# Patient Record
Sex: MALE | Race: WHITE | HISPANIC OR LATINO | Employment: FULL TIME | URBAN - METROPOLITAN AREA
[De-identification: names, ages, dates, MRNs, and addresses within clinical notes are randomized per-mention and may not be internally consistent; named-entity substitution may affect disease eponyms.]

---

## 2023-03-15 DIAGNOSIS — I10 HYPERTENSION, UNSPECIFIED TYPE: Primary | ICD-10-CM

## 2023-03-16 RX ORDER — LOSARTAN POTASSIUM AND HYDROCHLOROTHIAZIDE 25; 100 MG/1; MG/1
TABLET ORAL
Qty: 90 TABLET | Refills: 0 | Status: SHIPPED | OUTPATIENT
Start: 2023-03-16 | End: 2023-08-08

## 2023-03-16 RX ORDER — NEBIVOLOL 10 MG/1
10 TABLET ORAL DAILY
Qty: 90 TABLET | Refills: 0 | Status: SHIPPED | OUTPATIENT
Start: 2023-03-16 | End: 2023-05-30

## 2023-04-05 RX ORDER — VENLAFAXINE HYDROCHLORIDE 150 MG/1
150 CAPSULE, EXTENDED RELEASE ORAL DAILY
Qty: 30 CAPSULE | Refills: 1 | Status: SHIPPED | OUTPATIENT
Start: 2023-04-05 | End: 2023-05-31 | Stop reason: SDUPTHER

## 2023-05-05 ENCOUNTER — OFFICE VISIT (OUTPATIENT)
Dept: PRIMARY CARE | Facility: CLINIC | Age: 48
End: 2023-05-05
Payer: COMMERCIAL

## 2023-05-05 VITALS
TEMPERATURE: 97.1 F | HEIGHT: 68 IN | WEIGHT: 208 LBS | DIASTOLIC BLOOD PRESSURE: 82 MMHG | HEART RATE: 82 BPM | BODY MASS INDEX: 31.52 KG/M2 | SYSTOLIC BLOOD PRESSURE: 122 MMHG | OXYGEN SATURATION: 98 %

## 2023-05-05 DIAGNOSIS — Z00.00 ANNUAL PHYSICAL EXAM: Primary | ICD-10-CM

## 2023-05-05 DIAGNOSIS — F41.1 GENERALIZED ANXIETY DISORDER: ICD-10-CM

## 2023-05-05 DIAGNOSIS — Z13.1 SCREENING FOR DIABETES MELLITUS: ICD-10-CM

## 2023-05-05 DIAGNOSIS — E78.2 MIXED HYPERLIPIDEMIA: ICD-10-CM

## 2023-05-05 DIAGNOSIS — I10 ESSENTIAL HYPERTENSION, BENIGN: ICD-10-CM

## 2023-05-05 PROBLEM — G89.4 CHRONIC PAIN SYNDROME: Status: ACTIVE | Noted: 2022-08-12

## 2023-05-05 PROBLEM — G47.33 OBSTRUCTIVE SLEEP APNEA SYNDROME: Status: ACTIVE | Noted: 2022-08-12

## 2023-05-05 PROBLEM — K21.9 GERD (GASTROESOPHAGEAL REFLUX DISEASE): Status: ACTIVE | Noted: 2023-05-05

## 2023-05-05 PROBLEM — E55.9 VITAMIN D DEFICIENCY: Status: ACTIVE | Noted: 2022-08-12

## 2023-05-05 PROBLEM — F41.9 ANXIETY: Status: ACTIVE | Noted: 2023-05-05

## 2023-05-05 PROCEDURE — 3074F SYST BP LT 130 MM HG: CPT | Performed by: FAMILY MEDICINE

## 2023-05-05 PROCEDURE — 3079F DIAST BP 80-89 MM HG: CPT | Performed by: FAMILY MEDICINE

## 2023-05-05 PROCEDURE — 99214 OFFICE O/P EST MOD 30 MIN: CPT | Performed by: FAMILY MEDICINE

## 2023-05-05 PROCEDURE — 3008F BODY MASS INDEX DOCD: CPT | Performed by: FAMILY MEDICINE

## 2023-05-05 RX ORDER — FENOFIBRATE 145 MG/1
TABLET, FILM COATED ORAL
COMMUNITY
Start: 2021-07-23 | End: 2023-08-29 | Stop reason: SDUPTHER

## 2023-05-05 RX ORDER — AMITRIPTYLINE HYDROCHLORIDE 10 MG/1
TABLET, FILM COATED ORAL
COMMUNITY
End: 2023-05-05 | Stop reason: WASHOUT

## 2023-05-05 RX ORDER — ATORVASTATIN CALCIUM 80 MG/1
TABLET, FILM COATED ORAL
COMMUNITY
Start: 2021-07-26 | End: 2023-08-08

## 2023-05-05 RX ORDER — BUPROPION HYDROCHLORIDE 75 MG/1
75 TABLET ORAL 2 TIMES DAILY
Qty: 60 TABLET | Refills: 1 | Status: SHIPPED | OUTPATIENT
Start: 2023-05-05 | End: 2023-07-19

## 2023-05-05 NOTE — PROGRESS NOTES
Assessment     ASSESSMENT/PLAN:      Problem List Items Addressed This Visit          Circulatory    Essential hypertension, benign    Relevant Orders    Basic metabolic panel       Other    Generalized anxiety disorder    Relevant Medications    buPROPion (Wellbutrin) 75 mg tablet    Mixed hyperlipidemia    Relevant Orders    Lipid panel     Other Visit Diagnoses       Annual physical exam    -  Primary    Screening-pulmonary TB        Screening for diabetes mellitus        Relevant Orders    Hemoglobin A1c            Patient Instructions:  Patient Instructions   TB screening: Unable to perform due to computer technicalities, did offer TB spot however patient declined and would rather get TB skin test at health center  Anxiety: Discussed how changing Effexor may cause issues with anxiety control, opt to add low-dose Wellbutrin to augment depression symptoms      Signed by: Romeo Francois,        FUTURE DIRECTION:   If no improvement in symptoms can begin weaning venlafaxine starting at 75 mg    Subjective   SUBJECTIVE:     HPI : Patient is a 47 y.o. male who presents today for the following:     CPE:  -States that he needs TB skin test for his job    Anxiety:    -improved with Effexor 150 mg however concerned that he has issues with ductile dysfunction, would like to switch to Wellbutrin.  States that he felt better when he was using Wellbutrin alone    Not Addressed  HLD/hyper triglycerides:   -Continue atorvastatin, fenofibrate, incosepanet    TRISTON:   -Uses CPAP    GERD:   -Continue omeprazole, uses Zegerid      HTN:   -On losartan-HCTZ and Bystolic    Fatigue:   -Not taking amitriptyline 10 mg    Chronic pain:   -Patient does mention experiencing chronic low back pain, there was exacerbation over the past week however symptoms has improved    Left upper extremity neuropathy:   -Was seen in the emergency room 1 week ago due to paresthesias into face and neck, CT spine confirming arthritis and cervical  region, patient wants to try physical therapy, exercise, OMT, not interested in gabapentin or duloxetine or Lyrica    Review of Systems   All other systems reviewed and are negative.      Past Medical History:   Diagnosis Date    Anxiety     Hypertension     Sleep apnea         Past Surgical History:   Procedure Laterality Date    OTHER SURGICAL HISTORY  07/23/2021    Vasectomy    OTHER SURGICAL HISTORY  07/23/2021    Tooth extraction        Current Outpatient Medications   Medication Instructions    atorvastatin (Lipitor) 80 mg tablet oral    buPROPion (WELLBUTRIN) 75 mg, oral, 2 times daily    fenofibrate (Tricor) 145 mg tablet fenofibrate nanocrystallized 145 mg tablet    fish oil concentrate (Omega-3) 120-180 mg capsule     losartan-hydrochlorothiazide (Hyzaar) 100-25 mg tablet TAKE 1 TABLET BY MOUTH EVERY DAY    nebivolol (BYSTOLIC) 10 mg, oral, Daily    venlafaxine XR (EFFEXOR-XR) 150 mg, oral, Daily        No Known Allergies     Social History     Socioeconomic History    Marital status: Legally      Spouse name: Not on file    Number of children: Not on file    Years of education: Not on file    Highest education level: Not on file   Occupational History    Not on file   Tobacco Use    Smoking status: Former     Packs/day: 0.50     Types: Cigarettes    Smokeless tobacco: Not on file   Vaping Use    Vaping status: Not on file   Substance and Sexual Activity    Alcohol use: Yes     Alcohol/week: 2.0 - 3.0 standard drinks of alcohol     Types: 2 - 3 Glasses of wine per week    Drug use: Not on file    Sexual activity: Not on file   Other Topics Concern    Not on file   Social History Narrative    Not on file     Social Determinants of Health     Financial Resource Strain: Not on file   Food Insecurity: Not on file   Transportation Needs: Not on file   Physical Activity: Not on file   Stress: Not on file   Social Connections: Not on file   Intimate Partner Violence: Not on file   Housing Stability: Not  "on file        No family history on file.     Objective     OBJECTIVE:     Vitals:    05/05/23 1408   BP: 122/82   Pulse: 82   Temp: 36.2 °C (97.1 °F)   SpO2: 98%   Weight: 94.3 kg (208 lb)   Height: 1.727 m (5' 8\")        Physical Exam  Constitutional:       Appearance: Normal appearance.   HENT:      Head: Normocephalic.   Pulmonary:      Effort: Pulmonary effort is normal.   Musculoskeletal:      Cervical back: Normal range of motion.   Neurological:      Mental Status: He is alert.   Psychiatric:         Mood and Affect: Mood normal.       "

## 2023-05-16 ENCOUNTER — TELEPHONE (OUTPATIENT)
Dept: PRIMARY CARE | Facility: CLINIC | Age: 48
End: 2023-05-16

## 2023-05-16 NOTE — TELEPHONE ENCOUNTER
Pt called rx line on 5/15 at 423p [facility] a [facility], pt states only has 4 left.    [facility]  Next ov 5/23  Pt should have enough till OV

## 2023-05-18 RX ORDER — VENLAFAXINE HYDROCHLORIDE 150 MG/1
CAPSULE, EXTENDED RELEASE ORAL
Qty: 30 CAPSULE | Refills: 1 | OUTPATIENT
Start: 2023-05-18

## 2023-05-29 DIAGNOSIS — F41.1 GENERALIZED ANXIETY DISORDER: ICD-10-CM

## 2023-05-29 DIAGNOSIS — I10 HYPERTENSION, UNSPECIFIED TYPE: ICD-10-CM

## 2023-05-30 RX ORDER — NEBIVOLOL 10 MG/1
10 TABLET ORAL DAILY
Qty: 90 TABLET | Refills: 0 | Status: SHIPPED | OUTPATIENT
Start: 2023-05-30 | End: 2023-08-08

## 2023-05-30 RX ORDER — BUPROPION HYDROCHLORIDE 75 MG/1
TABLET ORAL
Qty: 60 TABLET | Refills: 1 | OUTPATIENT
Start: 2023-05-30

## 2023-05-31 RX ORDER — VENLAFAXINE HYDROCHLORIDE 150 MG/1
150 CAPSULE, EXTENDED RELEASE ORAL DAILY
Qty: 90 CAPSULE | Refills: 1 | Status: SHIPPED | OUTPATIENT
Start: 2023-05-31 | End: 2023-11-10 | Stop reason: SDUPTHER

## 2023-07-19 DIAGNOSIS — F41.1 GENERALIZED ANXIETY DISORDER: ICD-10-CM

## 2023-07-19 RX ORDER — BUPROPION HYDROCHLORIDE 75 MG/1
75 TABLET ORAL 2 TIMES DAILY
Qty: 60 TABLET | Refills: 3 | Status: SHIPPED | OUTPATIENT
Start: 2023-07-19 | End: 2023-11-10 | Stop reason: SDUPTHER

## 2023-08-07 DIAGNOSIS — I10 HYPERTENSION, UNSPECIFIED TYPE: ICD-10-CM

## 2023-08-07 DIAGNOSIS — E78.2 MIXED HYPERLIPIDEMIA: Primary | ICD-10-CM

## 2023-08-08 RX ORDER — ATORVASTATIN CALCIUM 80 MG/1
80 TABLET, FILM COATED ORAL NIGHTLY
Qty: 30 TABLET | Refills: 2 | Status: SHIPPED | OUTPATIENT
Start: 2023-08-08 | End: 2023-10-10

## 2023-08-08 RX ORDER — NEBIVOLOL 10 MG/1
10 TABLET ORAL DAILY
Qty: 30 TABLET | Refills: 2 | Status: SHIPPED | OUTPATIENT
Start: 2023-08-08 | End: 2023-11-10 | Stop reason: SDUPTHER

## 2023-08-08 RX ORDER — LOSARTAN POTASSIUM AND HYDROCHLOROTHIAZIDE 25; 100 MG/1; MG/1
TABLET ORAL
Qty: 30 TABLET | Refills: 2 | Status: SHIPPED | OUTPATIENT
Start: 2023-08-08 | End: 2023-11-10 | Stop reason: SDUPTHER

## 2023-08-29 DIAGNOSIS — E78.2 MIXED HYPERLIPIDEMIA: Primary | ICD-10-CM

## 2023-08-29 NOTE — TELEPHONE ENCOUNTER
Pt called requesting a refill on pended med    Last rx sent via [employer] 8/22 - 6 mo    Next OV 9/6  Pt OUT  [facility]

## 2023-08-31 RX ORDER — FENOFIBRATE 145 MG/1
145 TABLET, FILM COATED ORAL DAILY
Qty: 7 TABLET | Refills: 0 | Status: SHIPPED | OUTPATIENT
Start: 2023-08-31 | End: 2023-09-06 | Stop reason: SDUPTHER

## 2023-09-06 ENCOUNTER — TELEPHONE (OUTPATIENT)
Dept: PRIMARY CARE | Facility: CLINIC | Age: 48
End: 2023-09-06

## 2023-09-06 ENCOUNTER — TELEMEDICINE (OUTPATIENT)
Dept: PRIMARY CARE | Facility: CLINIC | Age: 48
End: 2023-09-06
Payer: COMMERCIAL

## 2023-09-06 ENCOUNTER — APPOINTMENT (OUTPATIENT)
Dept: PRIMARY CARE | Facility: CLINIC | Age: 48
End: 2023-09-06
Payer: COMMERCIAL

## 2023-09-06 DIAGNOSIS — E78.2 MIXED HYPERLIPIDEMIA: ICD-10-CM

## 2023-09-06 DIAGNOSIS — R73.01 IFG (IMPAIRED FASTING GLUCOSE): ICD-10-CM

## 2023-09-06 DIAGNOSIS — K85.80 OTHER ACUTE PANCREATITIS, UNSPECIFIED COMPLICATION STATUS (HHS-HCC): ICD-10-CM

## 2023-09-06 DIAGNOSIS — E78.1 HYPERTRIGLYCERIDEMIA: Primary | ICD-10-CM

## 2023-09-06 PROCEDURE — 99214 OFFICE O/P EST MOD 30 MIN: CPT | Performed by: FAMILY MEDICINE

## 2023-09-06 RX ORDER — FENOFIBRATE 145 MG/1
145 TABLET, FILM COATED ORAL DAILY
Qty: 90 TABLET | Refills: 0 | Status: SHIPPED | OUTPATIENT
Start: 2023-09-06 | End: 2023-11-10 | Stop reason: SDUPTHER

## 2023-09-06 RX ORDER — OMEGA-3-ACID ETHYL ESTERS 1 G/1
2 CAPSULE, LIQUID FILLED ORAL 2 TIMES DAILY
Qty: 360 CAPSULE | Refills: 0 | Status: SHIPPED | OUTPATIENT
Start: 2023-09-06 | End: 2023-11-10 | Stop reason: SDUPTHER

## 2023-09-06 ASSESSMENT — ENCOUNTER SYMPTOMS
ABDOMINAL PAIN: 0
SHORTNESS OF BREATH: 0

## 2023-09-06 NOTE — PROGRESS NOTES
Assessment     ASSESSMENT/PLAN:      Problem List Items Addressed This Visit       Mixed hyperlipidemia    Relevant Medications    fenofibrate (Tricor) 145 mg tablet    Other Relevant Orders    Referral to Cardiology    Lipid panel     Other Visit Diagnoses       Hypertriglyceridemia    -  Primary    Relevant Orders    Referral to Cardiology    Lipid panel    Other acute pancreatitis, unspecified complication status        Relevant Medications    omega-3 acid ethyl esters (Lovaza) 1 gram capsule    Other Relevant Orders    Hemoglobin A1c    IFG (impaired fasting glucose)        Relevant Orders    Referral to Endocrinology          Patient consented to virtual encounter. If patient was felt to need in-person evaluation they were directed to the office or ED as appropriate. Total time spent interacting with patient was 12: 55 minutes.     Patient presents today via video telemedicine encounter. This encounter is not taking place in person due to COVID.    Patient Instructions:  Patient Instructions   Acute Pancreatitis: pain has improved   Hypertriglyceridemia: refer to cardiology, fenofibrate and lovaza ordered, recheck triglycerides   IFG: recheck A1c, concerned about insulin resistance so pt was referred to endo   Work note provided       Signed by: Romeo Francois DO       FUTURE DIRECTION:   none    Subjective   SUBJECTIVE:     HPI : Patient is a 48 y.o. male who presents today for the followin/4 had covid, two weeks later had progressively back pain that migrated to abd. Pain worsened and EMS was called and went to [facility] on 2023 and transferred to ICU at [facility] because of triglycerifes was >[id number],   Symptoms improved and pt was discharged on 2023. Was started on lovaza and fenofibrate was represcribed.   Patient present with his wife and shares concern about insulin resistance. Although patient has significanlty changed diet by reducing red meats and carbs. Pt does note family  history of high triglycerides.       Review of Systems   Respiratory:  Negative for shortness of breath.    Cardiovascular:  Negative for chest pain.   Gastrointestinal:  Negative for abdominal pain.       Past Medical History:   Diagnosis Date    Anxiety     Hypertension     Sleep apnea         Past Surgical History:   Procedure Laterality Date    OTHER SURGICAL HISTORY  07/23/2021    Vasectomy    OTHER SURGICAL HISTORY  07/23/2021    Tooth extraction        Current Outpatient Medications   Medication Instructions    atorvastatin (LIPITOR) 80 mg, oral, Nightly    buPROPion (WELLBUTRIN) 75 mg, oral, 2 times daily    fenofibrate (TRICOR) 145 mg, oral, Daily    fish oil concentrate (Omega-3) 120-180 mg capsule     losartan-hydrochlorothiazide (Hyzaar) 100-25 mg tablet TAKE 1 TABLET BY MOUTH EVERY DAY    nebivolol (BYSTOLIC) 10 mg, oral, Daily    omega-3 acid ethyl esters (LOVAZA) 2 g, oral, 2 times daily    venlafaxine XR (EFFEXOR-XR) 150 mg, oral, Daily        No Known Allergies     Social History     Socioeconomic History    Marital status:      Spouse name: Not on file    Number of children: Not on file    Years of education: Not on file    Highest education level: Not on file   Occupational History    Not on file   Tobacco Use    Smoking status: Former     Packs/day: .5     Types: Cigarettes    Smokeless tobacco: Not on file   Substance and Sexual Activity    Alcohol use: Yes     Alcohol/week: 2.0 - 3.0 standard drinks of alcohol     Types: 2 - 3 Glasses of wine per week    Drug use: Not on file    Sexual activity: Not on file   Other Topics Concern    Not on file   Social History Narrative    Not on file     Social Determinants of Health     Financial Resource Strain: Not on file   Food Insecurity: Not on file   Transportation Needs: Not on file   Physical Activity: Not on file   Stress: Not on file   Social Connections: Not on file   Intimate Partner Violence: Not on file   Housing Stability: Not on file         No family history on file.     Objective     OBJECTIVE:     There were no vitals filed for this visit.     Physical Exam  Constitutional:       Appearance: Normal appearance.   HENT:      Head: Normocephalic.   Pulmonary:      Effort: Pulmonary effort is normal.   Musculoskeletal:      Cervical back: Normal range of motion.   Neurological:      Mental Status: He is alert.   Psychiatric:         Mood and Affect: Mood normal.

## 2023-09-06 NOTE — LETTER
Date: 2023  RE:  Gucci Winters  :  1975      To Whom It May Concern:    Our patient, Gucci, was hospitalized from 2023-2023.  Symptoms has improved and he is back to baseline. He may return back to work without restrictions.    Their return to work date is:  2023      Sincerely,      Romeo Francois DO

## 2023-09-06 NOTE — Clinical Note
Attempted to call pt but no response. Please let him know that I sent rx to [facility]. Please let me know if there is a different pharmacy he wanted. Not sure if he mentioned mail order.

## 2023-09-06 NOTE — PATIENT INSTRUCTIONS
Acute Pancreatitis: pain has improved   Hypertriglyceridemia: refer to cardiology, fenofibrate and lovaza ordered, recheck triglycerides   IFG: recheck A1c, concerned about insulin resistance so pt was referred to endo   Work note provided

## 2023-09-08 ENCOUNTER — TELEPHONE (OUTPATIENT)
Dept: PRIMARY CARE | Facility: CLINIC | Age: 48
End: 2023-09-08

## 2023-09-19 ENCOUNTER — LAB (OUTPATIENT)
Dept: LAB | Facility: LAB | Age: 48
End: 2023-09-19
Payer: COMMERCIAL

## 2023-09-19 DIAGNOSIS — E78.1 HYPERTRIGLYCERIDEMIA: ICD-10-CM

## 2023-09-19 DIAGNOSIS — K85.80 OTHER ACUTE PANCREATITIS, UNSPECIFIED COMPLICATION STATUS (HHS-HCC): ICD-10-CM

## 2023-09-19 DIAGNOSIS — E78.2 MIXED HYPERLIPIDEMIA: ICD-10-CM

## 2023-09-19 LAB
CHOLESTEROL (MG/DL) IN SER/PLAS: 167 MG/DL (ref 0–199)
CHOLESTEROL IN HDL (MG/DL) IN SER/PLAS: 45.5 MG/DL
CHOLESTEROL/HDL RATIO: 3.7
ESTIMATED AVERAGE GLUCOSE FOR HBA1C: 131 MG/DL
HEMOGLOBIN A1C/HEMOGLOBIN TOTAL IN BLOOD: 6.2 %
LDL: 72 MG/DL (ref 0–99)
NON HDL CHOLESTEROL: 122 MG/DL
TRIGLYCERIDE (MG/DL) IN SER/PLAS: 247 MG/DL (ref 0–149)
VLDL: 49 MG/DL (ref 0–40)

## 2023-09-19 PROCEDURE — 83036 HEMOGLOBIN GLYCOSYLATED A1C: CPT

## 2023-09-19 PROCEDURE — 36415 COLL VENOUS BLD VENIPUNCTURE: CPT

## 2023-09-19 PROCEDURE — 80061 LIPID PANEL: CPT

## 2023-09-20 ENCOUNTER — TELEPHONE (OUTPATIENT)
Dept: PRIMARY CARE | Facility: CLINIC | Age: 48
End: 2023-09-20

## 2023-09-25 NOTE — PROGRESS NOTES
[facility] V[facility] Card[facility]    Patient Name: Gucci Winters  Patient : 1975      Problem List Items Addressed This Visit       Essential hypertension, benign - Primary    Mixed hyperlipidemia    Obesity       Scribe Attestation  By signing my name below, IZahraa ErnaRandy Del Cid   attest that this documentation has been prepared under the direction and in the presence of Reed Ware DO.     Assessment/Plan:   Hypertension  The patient has a history of hypertension which appears controlled on exam today.  ECG done today showed sinus rhythm with a heart rate of 74 bpm.    He denies chest pain, headache or lightheadedness.  He reports that his usual blood pressure at home is in the 120s/80s range.   He should continue his current antihypertensive medications.    Recent lab works as noted in the HPI.   He should continue to monitor his blood pressure at home.  Continue risk factor modification.  Follow up in 1 year and sooner if necessary.     2. Dyslipidemia  Lipid panel done 2023 showed an LDL cholesterol of 72 and triglycerides of 247 while on atorvastatin 80 mg daily, fenofibrate 145 mg daily and Lovaza 1 g daily.  Coronary artery calcium scoring done in 2021 showed a total score of 0 placing the patient in a low risk group.  Please see lifestyle recommendations below.     3. Obesity  Please see lifestyle recommendations below.     Orders:   Follow-up in 1 year.    Lifestyle Recommendations     I recommend a whole-food plant-based diet, an eating pattern that encourages the consumption of unrefined plant foods (such as fruits, vegetables, tubers, whole grains, legumes, nuts and seeds) and discourages meats, dairy products, eggs and processed foods.           The AHA/ACC recommends that the patient consume a dietary pattern that emphasizes intake of vegetables, fruits, and whole grains; includes low-fat dairy products, poultry, fish, legumes, non-tropical vegetable  oils, and nuts; and limits intake of sodium, sweets, sugar-sweetened beverages, and red meats.     Adapt this dietary pattern to appropriate calorie requirements (a 500-750 kcal/day deficit to lose weight), personal and cultural food preferences, and nutrition therapy for other medical conditions (including diabetes).     Achieve this pattern by following plans such as the [place] Mediterranean, DASH dietary pattern, or AHA diet.           Engage in 2 hours and 30 minutes per week of moderate-intensity physical activity, or 1 hour and 15 minutes (75 minutes) per week of vigorous-intensity aerobic physical activity, or an equivalent combination of moderate and vigorous-intensity aerobic physical activity. Aerobic activity should be performed in episodes of at least 10 minutes preferably spread throughout the week.           Adhering to a heart healthy diet, regular exercise habits, avoidance of tobacco products, and maintenance of a healthy weight are crucial components of their heart disease risk reduction.           Any positive review of systems not specifically addressed in the office visit today should be evaluated and treated by the patients primary care physician or in an emergency department if necessary           Patient was notified that results from ordered tests will be called to the patient if it changes current management; it will otherwise be discussed at a future appointment.           Thank you for allowing me to participate in the care of this patient.                 This document was generated using the assistance of voice recognition software. If there are any errors of spelling, grammar, syntax, or meaning; please feel free to contact me directly for clarification.       Cardiac Testing  Echocardiogram:    Stress Testing:    Cardiac Cath:    Other Cardiac Testing:  Coronary artery calcium scoring done in November 2021 showed a total score of 0 placing the patient in a low risk group.    Reason for  visit:  This is a 48-year-old male here for evaluation regarding hypertension and dyslipidemia.      HPI:  This is a 48-year-old male here for evaluation regarding hypertension and dyslipidemia.  Patient was previously seen by cardiology in November 2021.  CBC done in February 2023 showed a hemoglobin of 15.3, serum magnesium was 1.52, BMP showed a serum sodium 135, serum potassium 3.6, serum creatinine 0.82.  Coronary artery calcium scoring done in November 2021 showed a total score of 0 placing the patient in a low risk group. Lipid panel done 9/19/2023 showed an LDL cholesterol of 72 and triglycerides of 247 while on atorvastatin 80 mg daily and fenofibrate 145 mg daily.  Hemoglobin A1c done 9/19/2023 was 6.2%. ECG done today showed sinus rhythm with a heart rate of 74 bpm.  The patient reports that he has been feeling generally well from the cardiac standpoint. He denies any new chest pain, shortness of breath, palpitations and lightheadedness. He reports that his usual blood pressure at home is in the 120s/80s range. He states that during his recent hospitalization for pancreatitis, his LDL level was noted to be extremely elevated. He states that he was discharged on Lovaza 1g daily. He states that he takes all of his medications as prescribed. During my exam, he was resting comfortably on the exam table.           Past Medical History:   Diagnosis Date    Anxiety     Hypertension     Sleep apnea         Past Surgical History:   Procedure Laterality Date    OTHER SURGICAL HISTORY  07/23/2021    Vasectomy    OTHER SURGICAL HISTORY  07/23/2021    Tooth extraction        No family history on file.     Social History     Socioeconomic History    Marital status:      Spouse name: Not on file    Number of children: Not on file    Years of education: Not on file    Highest education level: Not on file   Occupational History    Not on file   Tobacco Use    Smoking status: Former     Packs/day: .5     Types:  Cigarettes    Smokeless tobacco: Not on file   Substance and Sexual Activity    Alcohol use: Yes     Alcohol/week: 2.0 - 3.0 standard drinks of alcohol     Types: 2 - 3 Glasses of wine per week    Drug use: Not on file    Sexual activity: Not on file   Other Topics Concern    Not on file   Social History Narrative    Not on file     Social Determinants of Health     Financial Resource Strain: Not on file   Food Insecurity: Not on file   Transportation Needs: Not on file   Physical Activity: Not on file   Stress: Not on file   Social Connections: Not on file   Intimate Partner Violence: Not on file   Housing Stability: Not on file       No Known Allergies    Current Outpatient Medications   Medication Instructions    atorvastatin (LIPITOR) 80 mg, oral, Nightly    buPROPion (WELLBUTRIN) 75 mg, oral, 2 times daily    fenofibrate (TRICOR) 145 mg, oral, Daily    fish oil concentrate (Omega-3) 120-180 mg capsule     losartan-hydrochlorothiazide (Hyzaar) 100-25 mg tablet TAKE 1 TABLET BY MOUTH EVERY DAY    nebivolol (BYSTOLIC) 10 mg, oral, Daily    omega-3 acid ethyl esters (LOVAZA) 2 g, oral, 2 times daily    venlafaxine XR (EFFEXOR-XR) 150 mg, oral, Daily          ROS:  A 14 point review of systems was done and is negative other than as stated in HPI    Vitals:  There were no vitals filed for this visit.    Physical Exam:     General: Alert and Oriented, No distress, cooperative, appears stated age  Head: Normocephalic without obvious abnormality, atraumatic  Eyes: Conjunctiva/corneas clear, EOM's grossly intact  Nose: No drainage or sinus tenderness  Throat: Mucus membranes moist.  No ulcerations noted  Neck: Supple, trachea midline, No thyroid enlargement/tenderness/nodules; No carotid bruit or JVD  Lungs: Clear to auscultation bilaterally, no wheezes, rhonci, or rales. respirations unlabored  Chest Wall: No tenderness or deformity  Heart: Regular rhythm, normal S1/S2, no murmur  Abdomen: Soft, non-tender,  Non-distended, bowel sounds active, no masses. Obese.  Extremities: No significant pitting edema, no cyanosis  Skin: Skin color, texture, turgor normal.  No rashes or lesions noted  Neurologic: CNII-XII grossly intact. Strength equal bilaterally, Sensation grossly intact.      Outpatient Medications  Current Outpatient Medications on File Prior to Visit   Medication Sig Dispense Refill    atorvastatin (Lipitor) 80 mg tablet Take 1 tablet (80 mg) by mouth once daily at bedtime. 30 tablet 2    buPROPion (Wellbutrin) 75 mg tablet Take 1 tablet (75 mg) by mouth 2 times a day. 60 tablet 3    fenofibrate (Tricor) 145 mg tablet Take 1 tablet (145 mg) by mouth once daily. 90 tablet 0    fish oil concentrate (Omega-3) 120-180 mg capsule       losartan-hydrochlorothiazide (Hyzaar) 100-25 mg tablet TAKE 1 TABLET BY MOUTH EVERY DAY 30 tablet 2    nebivolol (Bystolic) 10 mg tablet TAKE 1 TABLET BY MOUTH EVERY DAY 30 tablet 2    omega-3 acid ethyl esters (Lovaza) 1 gram capsule Take 2 capsules (2 g) by mouth 2 times a day. 360 capsule 0    venlafaxine XR (Effexor-XR) 150 mg 24 hr capsule Take 1 capsule (150 mg) by mouth once daily. 90 capsule 1     No current facility-administered medications on file prior to visit.       Labs: (past 26 weeks)  Recent Results (from the past 4368 hour(s))   Hemoglobin A1c    Collection Time: 09/19/23  7:48 AM   Result Value Ref Range    Hemoglobin A1C 6.2 (A) %    Estimated Average Glucose 131 MG/DL   Lipid panel    Collection Time: 09/19/23  7:48 AM   Result Value Ref Range    Cholesterol 167 0 - 199 mg/dL    HDL 45.5 mg/dL    Cholesterol/HDL Ratio 3.7     LDL 72 0 - 99 mg/dL    VLDL 49 (H) 0 - 40 mg/dL    Triglycerides 247 (H) 0 - 149 mg/dL    Non HDL Cholesterol 122 mg/dL       ECG  No results found for this or any previous visit (from the past 4464 hour(s)).    Echocardiogram  No results found for this or any previous visit from the past 1095 days.              Reed Ware DO, FACC, FACOI

## 2023-09-29 RX ORDER — VENLAFAXINE HYDROCHLORIDE 75 MG/1
TABLET, EXTENDED RELEASE ORAL
COMMUNITY
End: 2023-11-10 | Stop reason: SDUPTHER

## 2023-09-29 RX ORDER — ICOSAPENT ETHYL 1 G/1
CAPSULE ORAL
COMMUNITY
End: 2023-12-05 | Stop reason: SDUPTHER

## 2023-09-29 RX ORDER — FENOFIBRATE 145 MG/1
1 TABLET, FILM COATED ORAL DAILY
COMMUNITY
End: 2023-11-21 | Stop reason: SDUPTHER

## 2023-09-29 RX ORDER — AMITRIPTYLINE HYDROCHLORIDE 10 MG/1
10 TABLET, FILM COATED ORAL DAILY PRN
COMMUNITY
Start: 2023-08-07 | End: 2024-03-26 | Stop reason: HOSPADM

## 2023-09-29 RX ORDER — ATORVASTATIN CALCIUM 80 MG/1
1 TABLET, FILM COATED ORAL DAILY
COMMUNITY
End: 2023-10-10 | Stop reason: WASHOUT

## 2023-09-29 RX ORDER — VENLAFAXINE HYDROCHLORIDE 75 MG/1
1 CAPSULE, EXTENDED RELEASE ORAL DAILY
COMMUNITY
Start: 2023-01-11 | End: 2023-11-10 | Stop reason: SDUPTHER

## 2023-09-29 RX ORDER — MONTELUKAST SODIUM 10 MG/1
TABLET ORAL
Status: ON HOLD | COMMUNITY
End: 2024-03-25 | Stop reason: ENTERED-IN-ERROR

## 2023-09-29 RX ORDER — NEBIVOLOL 5 MG/1
1 TABLET ORAL DAILY
COMMUNITY
End: 2023-11-10 | Stop reason: SDUPTHER

## 2023-09-29 RX ORDER — ERGOCALCIFEROL 1.25 MG/1
CAPSULE ORAL
Status: ON HOLD | COMMUNITY
End: 2024-03-25 | Stop reason: ENTERED-IN-ERROR

## 2023-09-29 RX ORDER — COVID-19 ANTIGEN TEST
KIT MISCELLANEOUS
COMMUNITY
Start: 2023-05-04

## 2023-09-29 RX ORDER — TRAZODONE HYDROCHLORIDE 50 MG/1
TABLET ORAL
COMMUNITY
End: 2024-03-15 | Stop reason: SDUPTHER

## 2023-09-29 RX ORDER — OMEPRAZOLE 20 MG/1
20 CAPSULE, DELAYED RELEASE ORAL DAILY
COMMUNITY

## 2023-09-29 RX ORDER — OMEGA-3-ACID ETHYL ESTERS 1 G/1
2 CAPSULE, LIQUID FILLED ORAL 2 TIMES DAILY
COMMUNITY
Start: 2023-08-24 | End: 2023-11-10 | Stop reason: SDUPTHER

## 2023-09-29 RX ORDER — CYCLOBENZAPRINE HCL 10 MG
1 TABLET ORAL NIGHTLY PRN
Status: ON HOLD | COMMUNITY
End: 2024-03-25 | Stop reason: ENTERED-IN-ERROR

## 2023-09-29 RX ORDER — ALBUTEROL SULFATE 90 UG/1
AEROSOL, METERED RESPIRATORY (INHALATION)
COMMUNITY

## 2023-09-29 RX ORDER — LOSARTAN POTASSIUM AND HYDROCHLOROTHIAZIDE 25; 100 MG/1; MG/1
1 TABLET ORAL DAILY
COMMUNITY
End: 2023-11-10 | Stop reason: SDUPTHER

## 2023-09-30 PROBLEM — E66.9 OBESITY: Status: ACTIVE | Noted: 2023-09-30

## 2023-10-02 ENCOUNTER — OFFICE VISIT (OUTPATIENT)
Dept: CARDIOLOGY | Facility: CLINIC | Age: 48
End: 2023-10-02
Payer: COMMERCIAL

## 2023-10-02 VITALS
DIASTOLIC BLOOD PRESSURE: 86 MMHG | HEIGHT: 68 IN | SYSTOLIC BLOOD PRESSURE: 140 MMHG | HEART RATE: 64 BPM | BODY MASS INDEX: 30.71 KG/M2 | WEIGHT: 202.6 LBS

## 2023-10-02 DIAGNOSIS — E66.09 CLASS 1 OBESITY DUE TO EXCESS CALORIES WITHOUT SERIOUS COMORBIDITY WITH BODY MASS INDEX (BMI) OF 31.0 TO 31.9 IN ADULT: ICD-10-CM

## 2023-10-02 DIAGNOSIS — E78.2 MIXED HYPERLIPIDEMIA: ICD-10-CM

## 2023-10-02 DIAGNOSIS — I10 ESSENTIAL HYPERTENSION, BENIGN: Primary | ICD-10-CM

## 2023-10-02 PROCEDURE — 3077F SYST BP >= 140 MM HG: CPT | Performed by: INTERNAL MEDICINE

## 2023-10-02 PROCEDURE — 93000 ELECTROCARDIOGRAM COMPLETE: CPT | Performed by: INTERNAL MEDICINE

## 2023-10-02 PROCEDURE — 3079F DIAST BP 80-89 MM HG: CPT | Performed by: INTERNAL MEDICINE

## 2023-10-02 PROCEDURE — 3008F BODY MASS INDEX DOCD: CPT | Performed by: INTERNAL MEDICINE

## 2023-10-02 PROCEDURE — 99213 OFFICE O/P EST LOW 20 MIN: CPT | Performed by: INTERNAL MEDICINE

## 2023-10-08 DIAGNOSIS — E78.2 MIXED HYPERLIPIDEMIA: ICD-10-CM

## 2023-10-10 ENCOUNTER — PATIENT MESSAGE (OUTPATIENT)
Dept: PRIMARY CARE | Facility: CLINIC | Age: 48
End: 2023-10-10

## 2023-10-10 DIAGNOSIS — R73.01 IFG (IMPAIRED FASTING GLUCOSE): Primary | ICD-10-CM

## 2023-10-10 RX ORDER — ATORVASTATIN CALCIUM 80 MG/1
80 TABLET, FILM COATED ORAL NIGHTLY
Qty: 30 TABLET | Refills: 0 | Status: SHIPPED | OUTPATIENT
Start: 2023-10-10 | End: 2023-10-31 | Stop reason: SDUPTHER

## 2023-10-31 DIAGNOSIS — E78.2 MIXED HYPERLIPIDEMIA: ICD-10-CM

## 2023-10-31 RX ORDER — VENLAFAXINE HYDROCHLORIDE 150 MG/1
150 CAPSULE, EXTENDED RELEASE ORAL DAILY
Qty: 90 CAPSULE | Refills: 1 | OUTPATIENT
Start: 2023-10-31

## 2023-10-31 RX ORDER — ATORVASTATIN CALCIUM 80 MG/1
80 TABLET, FILM COATED ORAL NIGHTLY
Qty: 30 TABLET | Refills: 0 | Status: SHIPPED | OUTPATIENT
Start: 2023-10-31 | End: 2023-11-10 | Stop reason: SDUPTHER

## 2023-10-31 NOTE — TELEPHONE ENCOUNTER
Pt called rx line @ 252 stating the Atorvastatin needs sent to [facility], it was sent to local [facility], nakia/ jing

## 2023-11-07 ENCOUNTER — APPOINTMENT (OUTPATIENT)
Dept: PRIMARY CARE | Facility: CLINIC | Age: 48
End: 2023-11-07

## 2023-11-09 DIAGNOSIS — F41.1 GENERALIZED ANXIETY DISORDER: ICD-10-CM

## 2023-11-09 DIAGNOSIS — I10 HYPERTENSION, UNSPECIFIED TYPE: ICD-10-CM

## 2023-11-09 DIAGNOSIS — E78.2 MIXED HYPERLIPIDEMIA: ICD-10-CM

## 2023-11-09 DIAGNOSIS — K85.80 OTHER ACUTE PANCREATITIS, UNSPECIFIED COMPLICATION STATUS (HHS-HCC): ICD-10-CM

## 2023-11-09 NOTE — TELEPHONE ENCOUNTER
Pt called rx line @ 323 req rf be sent to [facility] but didn't leave the meds he needs, I left a message for him to call back and let us know/ jing

## 2023-11-10 RX ORDER — BUPROPION HYDROCHLORIDE 75 MG/1
75 TABLET ORAL 2 TIMES DAILY
Qty: 180 TABLET | Refills: 1 | Status: SHIPPED | OUTPATIENT
Start: 2023-11-10 | End: 2023-11-21 | Stop reason: SDUPTHER

## 2023-11-10 RX ORDER — VENLAFAXINE HYDROCHLORIDE 150 MG/1
150 CAPSULE, EXTENDED RELEASE ORAL DAILY
Qty: 90 CAPSULE | Refills: 1 | Status: SHIPPED | OUTPATIENT
Start: 2023-11-10 | End: 2023-11-21 | Stop reason: SDUPTHER

## 2023-11-10 RX ORDER — NEBIVOLOL 10 MG/1
10 TABLET ORAL DAILY
Qty: 90 TABLET | Refills: 1 | Status: SHIPPED | OUTPATIENT
Start: 2023-11-10 | End: 2023-11-21 | Stop reason: SDUPTHER

## 2023-11-10 RX ORDER — FENOFIBRATE 145 MG/1
145 TABLET, FILM COATED ORAL DAILY
Qty: 90 TABLET | Refills: 1 | Status: SHIPPED | OUTPATIENT
Start: 2023-11-10 | End: 2023-11-27 | Stop reason: SDUPTHER

## 2023-11-10 RX ORDER — ATORVASTATIN CALCIUM 80 MG/1
80 TABLET, FILM COATED ORAL NIGHTLY
Qty: 90 TABLET | Refills: 1 | Status: SHIPPED | OUTPATIENT
Start: 2023-11-10 | End: 2023-11-21 | Stop reason: SDUPTHER

## 2023-11-10 RX ORDER — LOSARTAN POTASSIUM AND HYDROCHLOROTHIAZIDE 25; 100 MG/1; MG/1
1 TABLET ORAL DAILY
Qty: 90 TABLET | Refills: 1 | Status: SHIPPED | OUTPATIENT
Start: 2023-11-10 | End: 2023-11-21 | Stop reason: SDUPTHER

## 2023-11-10 RX ORDER — OMEGA-3-ACID ETHYL ESTERS 1 G/1
2 CAPSULE, LIQUID FILLED ORAL 2 TIMES DAILY
Qty: 360 CAPSULE | Refills: 1 | Status: SHIPPED | OUTPATIENT
Start: 2023-11-10 | End: 2024-03-26 | Stop reason: HOSPADM

## 2023-11-10 NOTE — TELEPHONE ENCOUNTER
Pt called rx line and stated he needs the pended [facility]s [facility]/ jing     Pt last ov on 5-5-23 has cancelled last 3 appt/ mk

## 2023-11-13 NOTE — TELEPHONE ENCOUNTER
Pt called rx line @ 9:54am requesting RF's on [facility]ended medications[facility]. Per pt we have been trying to call him to set up OV. Per pt please call back and leave detailed message if needed. Please schedule FU. Then back to triage for short supply. Thanks. JW

## 2023-11-14 NOTE — TELEPHONE ENCOUNTER
Appt made for 5/14/24 per message. Patient will need ref[facility]

## 2023-11-20 NOTE — TELEPHONE ENCOUNTER
Wife called wanting status of request. Per wife, we have wrong pharmacy. Will call back with new info. Waiting on call back. Thanks. JW

## 2023-11-20 NOTE — TELEPHONE ENCOUNTER
Wife called back with [facility] fax number. [facility] in chart is the right pharmacy. Fax number matches. Please send rx's. Thanks. ROLA

## 2023-11-20 NOTE — TELEPHONE ENCOUNTER
Wife called back. Pt is using [facility]. Only 1 [facility] pharmacy came up in our system. Can we send rx's to them and we will go from there. Wife will also try to track down the address and fax number. Pt needs these sent ASAP. This needs handled tomorrow when EOI is back. Please send. If this is not the right place wife will call back. Thanks. ROLA

## 2023-11-22 DIAGNOSIS — E78.1 HYPERTRIGLYCERIDEMIA: Primary | ICD-10-CM

## 2023-11-22 RX ORDER — VENLAFAXINE HYDROCHLORIDE 150 MG/1
150 CAPSULE, EXTENDED RELEASE ORAL DAILY
Qty: 90 CAPSULE | Refills: 1 | Status: SHIPPED | OUTPATIENT
Start: 2023-11-22 | End: 2023-11-27 | Stop reason: SDUPTHER

## 2023-11-22 RX ORDER — FENOFIBRATE 145 MG/1
145 TABLET, FILM COATED ORAL DAILY
Qty: 90 TABLET | Refills: 1 | Status: SHIPPED | OUTPATIENT
Start: 2023-11-22 | End: 2024-03-14 | Stop reason: SDUPTHER

## 2023-11-22 RX ORDER — BUPROPION HYDROCHLORIDE 75 MG/1
75 TABLET ORAL 2 TIMES DAILY
Qty: 180 TABLET | Refills: 1 | Status: SHIPPED | OUTPATIENT
Start: 2023-11-22 | End: 2024-03-14 | Stop reason: SDUPTHER

## 2023-11-22 RX ORDER — LOSARTAN POTASSIUM AND HYDROCHLOROTHIAZIDE 25; 100 MG/1; MG/1
1 TABLET ORAL DAILY
Qty: 90 TABLET | Refills: 1 | Status: SHIPPED | OUTPATIENT
Start: 2023-11-22 | End: 2024-03-14 | Stop reason: SDUPTHER

## 2023-11-22 RX ORDER — ATORVASTATIN CALCIUM 80 MG/1
80 TABLET, FILM COATED ORAL NIGHTLY
Qty: 90 TABLET | Refills: 1 | Status: SHIPPED | OUTPATIENT
Start: 2023-11-22 | End: 2023-11-27 | Stop reason: SDUPTHER

## 2023-11-22 RX ORDER — NEBIVOLOL 10 MG/1
10 TABLET ORAL DAILY
Qty: 90 TABLET | Refills: 1 | Status: SHIPPED | OUTPATIENT
Start: 2023-11-22 | End: 2024-03-14 | Stop reason: SDUPTHER

## 2023-11-22 NOTE — TELEPHONE ENCOUNTER
----- Message from Gucci Winters sent at 11/22/2023 12:13 PM EST -----  Regarding: Fish oil denied  Contact: [phone]  Juany, could we let them know it’s a necessity for my triglycerides?    Thank you,  Gucci

## 2023-11-27 DIAGNOSIS — E78.1 HYPERTRIGLYCERIDEMIA: ICD-10-CM

## 2023-11-27 DIAGNOSIS — E78.2 MIXED HYPERLIPIDEMIA: ICD-10-CM

## 2023-11-27 RX ORDER — ATORVASTATIN CALCIUM 80 MG/1
80 TABLET, FILM COATED ORAL NIGHTLY
Qty: 30 TABLET | Refills: 0 | Status: SHIPPED | OUTPATIENT
Start: 2023-11-27 | End: 2024-03-14 | Stop reason: SDUPTHER

## 2023-11-27 RX ORDER — VENLAFAXINE HYDROCHLORIDE 150 MG/1
150 CAPSULE, EXTENDED RELEASE ORAL DAILY
Qty: 30 CAPSULE | Refills: 0 | Status: SHIPPED | OUTPATIENT
Start: 2023-11-27 | End: 2024-03-21 | Stop reason: SDUPTHER

## 2023-11-27 RX ORDER — FENOFIBRATE 145 MG/1
145 TABLET, FILM COATED ORAL DAILY
Qty: 30 TABLET | Refills: 0 | Status: SHIPPED | OUTPATIENT
Start: 2023-11-27 | End: 2024-03-14 | Stop reason: WASHOUT

## 2023-11-27 NOTE — TELEPHONE ENCOUNTER
Pt spouse called and stated that mail order is still processing order for medications, but they [facility] [place] [facility] them until mail order comes in.   Needs Fenofibrate, atorvastatin, venlafaxine, and fish oil.   Pt has been out

## 2023-12-05 ENCOUNTER — TELEPHONE (OUTPATIENT)
Dept: PRIMARY CARE | Facility: CLINIC | Age: 48
End: 2023-12-05

## 2023-12-05 DIAGNOSIS — E78.2 MIXED HYPERLIPIDEMIA: Primary | ICD-10-CM

## 2023-12-05 RX ORDER — ICOSAPENT ETHYL 1 G/1
2 CAPSULE ORAL
Qty: 360 CAPSULE | Refills: 1 | Status: SHIPPED | OUTPATIENT
Start: 2023-12-05 | End: 2024-03-14 | Stop reason: SDUPTHER

## 2023-12-05 NOTE — TELEPHONE ENCOUNTER
Patient's wife called in asking if instead of the fish oil concentrate, can they have vascepa called in again? Insurance covers that but not the fish oil  Please advise

## 2023-12-06 ENCOUNTER — TELEPHONE (OUTPATIENT)
Dept: PRIMARY CARE | Facility: CLINIC | Age: 48
End: 2023-12-06

## 2024-03-13 ENCOUNTER — APPOINTMENT (OUTPATIENT)
Dept: PRIMARY CARE | Facility: CLINIC | Age: 49
End: 2024-03-13
Payer: COMMERCIAL

## 2024-03-14 ENCOUNTER — OFFICE VISIT (OUTPATIENT)
Dept: PRIMARY CARE | Facility: CLINIC | Age: 49
End: 2024-03-14
Payer: COMMERCIAL

## 2024-03-14 VITALS
DIASTOLIC BLOOD PRESSURE: 80 MMHG | HEART RATE: 82 BPM | WEIGHT: 201 LBS | BODY MASS INDEX: 30.56 KG/M2 | SYSTOLIC BLOOD PRESSURE: 130 MMHG | OXYGEN SATURATION: 97 %

## 2024-03-14 DIAGNOSIS — F41.1 GENERALIZED ANXIETY DISORDER: ICD-10-CM

## 2024-03-14 DIAGNOSIS — Z13.29 SCREENING FOR THYROID DISORDER: ICD-10-CM

## 2024-03-14 DIAGNOSIS — Z12.5 PROSTATE CANCER SCREENING: ICD-10-CM

## 2024-03-14 DIAGNOSIS — Z13.21 ENCOUNTER FOR VITAMIN DEFICIENCY SCREENING: ICD-10-CM

## 2024-03-14 DIAGNOSIS — Z12.11 ENCOUNTER FOR SCREENING FOR MALIGNANT NEOPLASM OF COLON: ICD-10-CM

## 2024-03-14 DIAGNOSIS — I10 HYPERTENSION, UNSPECIFIED TYPE: ICD-10-CM

## 2024-03-14 DIAGNOSIS — J45.998 SEASONAL ASTHMA (HHS-HCC): ICD-10-CM

## 2024-03-14 DIAGNOSIS — B02.8 HERPES ZOSTER WITH COMPLICATION: ICD-10-CM

## 2024-03-14 DIAGNOSIS — E78.2 MIXED HYPERLIPIDEMIA: ICD-10-CM

## 2024-03-14 DIAGNOSIS — Z00.00 ROUTINE GENERAL MEDICAL EXAMINATION AT A HEALTH CARE FACILITY: Primary | ICD-10-CM

## 2024-03-14 PROCEDURE — 99214 OFFICE O/P EST MOD 30 MIN: CPT | Performed by: FAMILY MEDICINE

## 2024-03-14 PROCEDURE — 99396 PREV VISIT EST AGE 40-64: CPT | Performed by: FAMILY MEDICINE

## 2024-03-14 PROCEDURE — 3008F BODY MASS INDEX DOCD: CPT | Performed by: FAMILY MEDICINE

## 2024-03-14 PROCEDURE — 3079F DIAST BP 80-89 MM HG: CPT | Performed by: FAMILY MEDICINE

## 2024-03-14 PROCEDURE — 3075F SYST BP GE 130 - 139MM HG: CPT | Performed by: FAMILY MEDICINE

## 2024-03-14 RX ORDER — BUPROPION HYDROCHLORIDE 75 MG/1
75 TABLET ORAL 2 TIMES DAILY
Qty: 180 TABLET | Refills: 1 | Status: SHIPPED | OUTPATIENT
Start: 2024-03-14 | End: 2024-04-24 | Stop reason: WASHOUT

## 2024-03-14 RX ORDER — ICOSAPENT ETHYL 1 G/1
2 CAPSULE ORAL
Qty: 360 CAPSULE | Refills: 1 | Status: SHIPPED | OUTPATIENT
Start: 2024-03-14 | End: 2024-04-24 | Stop reason: SDUPTHER

## 2024-03-14 RX ORDER — LOSARTAN POTASSIUM AND HYDROCHLOROTHIAZIDE 25; 100 MG/1; MG/1
1 TABLET ORAL DAILY
Qty: 90 TABLET | Refills: 1 | Status: SHIPPED | OUTPATIENT
Start: 2024-03-14 | End: 2024-05-21

## 2024-03-14 RX ORDER — FENOFIBRATE 145 MG/1
145 TABLET, FILM COATED ORAL DAILY
Qty: 90 TABLET | Refills: 1 | Status: SHIPPED | OUTPATIENT
Start: 2024-03-14 | End: 2024-05-21

## 2024-03-14 RX ORDER — NEBIVOLOL 10 MG/1
10 TABLET ORAL DAILY
Qty: 90 TABLET | Refills: 1 | Status: SHIPPED | OUTPATIENT
Start: 2024-03-14 | End: 2024-05-21

## 2024-03-14 RX ORDER — ATORVASTATIN CALCIUM 80 MG/1
80 TABLET, FILM COATED ORAL NIGHTLY
Qty: 90 TABLET | Refills: 1 | Status: SHIPPED | OUTPATIENT
Start: 2024-03-14 | End: 2024-05-21

## 2024-03-14 NOTE — LETTER
March 15, 2024     Patient: Gucci Winters   YOB: 1975   Date of Visit: 3/14/2024       To Whom It May Concern:    Gucci Winters was seen in my clinic on 3/14/2024 at 4:10 pm. Please excuse Gucci for his absence from work on 3/13/2024 to 3/15/2024. Return to work on 3/18/2024    If you have any questions or concerns, please don't hesitate to call.         Sincerely,         Romeo Francois DO        CC: No Recipients

## 2024-03-14 NOTE — PROGRESS NOTES
Assessment     ASSESSMENT/PLAN:      Patient Instructions   1. Herpes zoster with complication  Patient currently treated with Valtrex that he obtained from urgent care, advised to continue.  He was offered prednisone but he declined at this time    2. Mixed hyperlipidemia  Need to order repeat labs, medications refilled  - icosapent ethyL (Vascepa) 1 gram capsule; Take 2 capsules (2 g) by mouth 2 times a day with meals.  Dispense: 360 capsule; Refill: 1  - fenofibrate (Tricor) 145 mg tablet; Take 1 tablet (145 mg) by mouth once daily.  Dispense: 90 tablet; Refill: 1  - atorvastatin (Lipitor) 80 mg tablet; Take 1 tablet (80 mg) by mouth once daily at bedtime.  Dispense: 90 tablet; Refill: 1    3. Generalized anxiety disorder    - buPROPion (Wellbutrin) 75 mg tablet; Take 1 tablet (75 mg) by mouth 2 times a day.  Dispense: 180 tablet; Refill: 1    4. Hypertension, unspecified type  Controlled, medications refilled, no adverse reactio  - losartan-hydrochlorothiazide (Hyzaar) 100-25 mg tablet; Take 1 tablet by mouth once daily.  Dispense: 90 tablet; Refill: 1  - nebivolol (Bystolic) 10 mg tablet; Take 1 tablet (10 mg) by mouth once daily.  Dispense: 90 tablet; Refill: 1    5. Routine general medical examination at a health care facility    - Vitamin D 25-Hydroxy,Total (for eval of Vitamin D levels); Future  - Basic Metabolic Panel; Future  - TSH with reflex to Free T4 if abnormal; Future  - Hemoglobin A1C; Future  - Lipid Panel; Future  - Comprehensive Metabolic Panel; Future    6. Encounter for vitamin deficiency screening    - Vitamin D 25-Hydroxy,Total (for eval of Vitamin D levels); Future  - Vitamin B12; Future    7. Screening for thyroid disorder    - TSH with reflex to Free T4 if abnormal; Future    8. Encounter for screening for malignant neoplasm of colon  Patient requesting Cologuard, referral placed  - Cologuard® colon cancer screening; Future  - Cologuard® colon cancer screening    9. Prostate cancer  screening    - Prostate Spec.Ag,Screen; Future    10. Seasonal asthma             Signed by: Romeo Francois DO       FUTURE DIRECTION:   [Prednisone if needed for shingles ]    Subjective   SUBJECTIVE:     HPI : Patient is a 48 y.o. 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   Mood has been stable        HLD/hyper triglycerides:   Continue atorvastatin, fenofibrate, incosepanet  Hisyory of pancreatitis in 8/2023 requiring hospiutalization      TRISTON:   -Uses CPAP     GERD:   -Continue omeprazole, uses Zegerid        HTN:   -On losartan-HCTZ and Bystolic     Fatigue:   Taking amitriptyline prn for insomnia          Review of Systems    Past Medical History:   Diagnosis Date    Anxiety     Hypertension     Sleep apnea         Past Surgical History:   Procedure Laterality Date    OTHER SURGICAL HISTORY  07/23/2021    Vasectomy    OTHER SURGICAL HISTORY  07/23/2021    Tooth extraction        Current Outpatient Medications   Medication Instructions    albuterol 90 mcg/actuation inhaler inhalation    amitriptyline (ELAVIL) 10 mg, oral, Daily PRN    atorvastatin (LIPITOR) 80 mg, oral, Nightly    buPROPion (WELLBUTRIN) 75 mg, oral, 2 times daily    cyclobenzaprine (Flexeril) 10 mg tablet 1 tablet, oral, Nightly PRN    ergocalciferol (Vitamin D-2) 1.25 MG (49033 UT) capsule oral    fenofibrate (TRICOR) 145 mg, oral, Daily    icosapent ethyL (VASCEPA) 2 g, oral, 2 times daily with meals    losartan-hydrochlorothiazide (Hyzaar) 100-25 mg tablet 1 tablet, oral, Daily    montelukast (Singulair) 10 mg tablet oral    nebivolol (BYSTOLIC) 10 mg, oral, Daily    omega-3 acid ethyl esters (LOVAZA) 2 g, oral, 2 times daily    omeprazole (PriLOSEC) 20 mg DR capsule oral    QuickVue At-Home COVID-19 Test kit COVID TEST    traZODone (Desyrel) 50 mg tablet oral    venlafaxine XR (EFFEXOR-XR) 150 mg, oral, Daily         No Known Allergies     Social History     Socioeconomic History    Marital status:      Spouse name: Not on file    Number of children: Not on file    Years of education: Not on file    Highest education level: Not on file   Occupational History    Not on file   Tobacco Use    Smoking status: Former     Packs/day: .5     Types: Cigarettes    Smokeless tobacco: Not on file   Substance and Sexual Activity    Alcohol use: Yes     Alcohol/week: 2.0 - 3.0 standard drinks of alcohol     Types: 2 - 3 Glasses of wine per week    Drug use: Not on file    Sexual activity: Not on file   Other Topics Concern    Not on file   Social History Narrative    Not on file     Social Determinants of Health     Financial Resource Strain: Not on file   Food Insecurity: Not on file   Transportation Needs: Not on file   Physical Activity: Not on file   Stress: Not on file   Social Connections: Not on file   Intimate Partner Violence: Not on file   Housing Stability: Not on file        No family history on file.     Objective     OBJECTIVE:     Vitals:    03/14/24 1613   BP: 130/80   Pulse: 82   SpO2: 97%   Weight: 91.2 kg (201 lb)        Physical Exam  HENT:      Head: Normocephalic and atraumatic.      Nose: Nose normal.      Mouth/Throat:      Mouth: Mucous membranes are moist.   Eyes:      Pupils: Pupils are equal, round, and reactive to light.   Cardiovascular:      Rate and Rhythm: Normal rate and regular rhythm.      Pulses: Normal pulses.      Heart sounds: No murmur heard.  Pulmonary:      Effort: Pulmonary effort is normal.      Breath sounds: Normal breath sounds.   Abdominal:      Tenderness: There is no abdominal tenderness.   Musculoskeletal:         General: Normal range of motion.      Cervical back: Normal range of motion.   Skin:     General: Skin is warm and dry.      Comments: Resolving vesicular rash under left chest   Neurological:      Mental Status: He is alert.   Psychiatric:         Mood and Affect: Mood  normal.

## 2024-03-14 NOTE — PATIENT INSTRUCTIONS
1. Herpes zoster with complication  Patient currently treated with Valtrex that he obtained from urgent care, advised to continue.  He was offered prednisone but he declined at this time    2. Mixed hyperlipidemia  Need to order repeat labs, medications refilled  - icosapent ethyL (Vascepa) 1 gram capsule; Take 2 capsules (2 g) by mouth 2 times a day with meals.  Dispense: 360 capsule; Refill: 1  - fenofibrate (Tricor) 145 mg tablet; Take 1 tablet (145 mg) by mouth once daily.  Dispense: 90 tablet; Refill: 1  - atorvastatin (Lipitor) 80 mg tablet; Take 1 tablet (80 mg) by mouth once daily at bedtime.  Dispense: 90 tablet; Refill: 1    3. Generalized anxiety disorder    - buPROPion (Wellbutrin) 75 mg tablet; Take 1 tablet (75 mg) by mouth 2 times a day.  Dispense: 180 tablet; Refill: 1    4. Hypertension, unspecified type  Controlled, medications refilled, no adverse reactio  - losartan-hydrochlorothiazide (Hyzaar) 100-25 mg tablet; Take 1 tablet by mouth once daily.  Dispense: 90 tablet; Refill: 1  - nebivolol (Bystolic) 10 mg tablet; Take 1 tablet (10 mg) by mouth once daily.  Dispense: 90 tablet; Refill: 1    5. Routine general medical examination at a health care facility    - Vitamin D 25-Hydroxy,Total (for eval of Vitamin D levels); Future  - Basic Metabolic Panel; Future  - TSH with reflex to Free T4 if abnormal; Future  - Hemoglobin A1C; Future  - Lipid Panel; Future  - Comprehensive Metabolic Panel; Future    6. Encounter for vitamin deficiency screening    - Vitamin D 25-Hydroxy,Total (for eval of Vitamin D levels); Future  - Vitamin B12; Future    7. Screening for thyroid disorder    - TSH with reflex to Free T4 if abnormal; Future    8. Encounter for screening for malignant neoplasm of colon  Patient requesting Cologuard, referral placed  - Cologuard® colon cancer screening; Future  - Cologuard® colon cancer screening    9. Prostate cancer screening    - Prostate Spec.Ag,Screen; Future    10. Seasonal  asthma

## 2024-03-15 DIAGNOSIS — G47.00 INSOMNIA, UNSPECIFIED TYPE: ICD-10-CM

## 2024-03-15 DIAGNOSIS — B02.8 HERPES ZOSTER WITH COMPLICATION: Primary | ICD-10-CM

## 2024-03-15 RX ORDER — PREDNISONE 10 MG/1
TABLET ORAL
Qty: 11 TABLET | Refills: 0 | Status: SHIPPED | OUTPATIENT
Start: 2024-03-15 | End: 2024-03-26 | Stop reason: HOSPADM

## 2024-03-15 RX ORDER — TRAZODONE HYDROCHLORIDE 50 MG/1
50 TABLET ORAL NIGHTLY PRN
Qty: 90 TABLET | Refills: 0 | Status: SHIPPED | OUTPATIENT
Start: 2024-03-15 | End: 2024-04-24 | Stop reason: WASHOUT

## 2024-03-15 NOTE — TELEPHONE ENCOUNTER
I will refill trazodone but would avoid using it if he is still taking amitriptyline for sleep  Also, will send in prednisone taper

## 2024-03-15 NOTE — TELEPHONE ENCOUNTER
Pt called Rx line stating that he thought about it and would like prednisone to be sent in, pt was just seen yesterday 3/14/24. Please advise, AM    Also asking for a refill  on pended medication

## 2024-03-21 NOTE — TELEPHONE ENCOUNTER
Pt called Rx line asking for a refill on pended medication, please advise. Thanks, AM    Last seen 3/14/24

## 2024-03-22 RX ORDER — VENLAFAXINE HYDROCHLORIDE 150 MG/1
150 CAPSULE, EXTENDED RELEASE ORAL DAILY
Qty: 90 CAPSULE | Refills: 1 | Status: SHIPPED | OUTPATIENT
Start: 2024-03-22 | End: 2024-05-21

## 2024-03-25 ENCOUNTER — HOSPITAL ENCOUNTER (INPATIENT)
Facility: HOSPITAL | Age: 49
LOS: 1 days | Discharge: PSYCHIATRIC OR SUBSTANCE USE FACILITY | End: 2024-03-26
Attending: EMERGENCY MEDICINE | Admitting: INTERNAL MEDICINE
Payer: COMMERCIAL

## 2024-03-25 ENCOUNTER — APPOINTMENT (OUTPATIENT)
Dept: CARDIOLOGY | Facility: HOSPITAL | Age: 49
End: 2024-03-25
Payer: COMMERCIAL

## 2024-03-25 DIAGNOSIS — I95.9 HYPOTENSION, UNSPECIFIED HYPOTENSION TYPE: ICD-10-CM

## 2024-03-25 DIAGNOSIS — F10.20 ALCOHOLISM (MULTI): ICD-10-CM

## 2024-03-25 DIAGNOSIS — T50.902A INTENTIONAL DRUG OVERDOSE, INITIAL ENCOUNTER (MULTI): Primary | ICD-10-CM

## 2024-03-25 LAB
ALBUMIN SERPL BCP-MCNC: 4.1 G/DL (ref 3.4–5)
ALP SERPL-CCNC: 74 U/L (ref 33–120)
ALT SERPL W P-5'-P-CCNC: 29 U/L (ref 10–52)
ANION GAP SERPL CALC-SCNC: 13 MMOL/L (ref 10–20)
APAP SERPL-MCNC: <10 UG/ML
AST SERPL W P-5'-P-CCNC: 34 U/L (ref 9–39)
ATRIAL RATE: 79 BPM
BASOPHILS # BLD AUTO: 0.02 X10*3/UL (ref 0–0.1)
BASOPHILS NFR BLD AUTO: 0.4 %
BILIRUB SERPL-MCNC: 0.8 MG/DL (ref 0–1.2)
BUN SERPL-MCNC: 15 MG/DL (ref 6–23)
CALCIUM SERPL-MCNC: 9 MG/DL (ref 8.6–10.3)
CHLORIDE SERPL-SCNC: 100 MMOL/L (ref 98–107)
CO2 SERPL-SCNC: 29 MMOL/L (ref 21–32)
CREAT SERPL-MCNC: 0.86 MG/DL (ref 0.5–1.3)
EGFRCR SERPLBLD CKD-EPI 2021: >90 ML/MIN/1.73M*2
EOSINOPHIL # BLD AUTO: 0.04 X10*3/UL (ref 0–0.7)
EOSINOPHIL NFR BLD AUTO: 0.8 %
ERYTHROCYTE [DISTWIDTH] IN BLOOD BY AUTOMATED COUNT: 13.3 % (ref 11.5–14.5)
ETHANOL SERPL-MCNC: 174 MG/DL
ETHANOL SERPL-MCNC: 38 MG/DL
GLUCOSE SERPL-MCNC: 104 MG/DL (ref 74–99)
HCT VFR BLD AUTO: 41.5 % (ref 41–52)
HGB BLD-MCNC: 15.2 G/DL (ref 13.5–17.5)
IMM GRANULOCYTES # BLD AUTO: 0.01 X10*3/UL (ref 0–0.7)
IMM GRANULOCYTES NFR BLD AUTO: 0.2 % (ref 0–0.9)
LYMPHOCYTES # BLD AUTO: 1.94 X10*3/UL (ref 1.2–4.8)
LYMPHOCYTES NFR BLD AUTO: 40.5 %
MAGNESIUM SERPL-MCNC: 1.91 MG/DL (ref 1.6–2.4)
MCH RBC QN AUTO: 34.9 PG (ref 26–34)
MCHC RBC AUTO-ENTMCNC: 36.6 G/DL (ref 32–36)
MCV RBC AUTO: 95 FL (ref 80–100)
MONOCYTES # BLD AUTO: 0.29 X10*3/UL (ref 0.1–1)
MONOCYTES NFR BLD AUTO: 6.1 %
NEUTROPHILS # BLD AUTO: 2.49 X10*3/UL (ref 1.2–7.7)
NEUTROPHILS NFR BLD AUTO: 52 %
NRBC BLD-RTO: 0 /100 WBCS (ref 0–0)
P AXIS: 32 DEGREES
PLATELET # BLD AUTO: 202 X10*3/UL (ref 150–450)
POTASSIUM SERPL-SCNC: 3.4 MMOL/L (ref 3.5–5.3)
PR INTERVAL: 160 MS
PROT SERPL-MCNC: 7.1 G/DL (ref 6.4–8.2)
Q ONSET: 254 MS
QRS COUNT: 13 BEATS
QRS DURATION: 98 MS
QT INTERVAL: 398 MS
QTC CALCULATION(BAZETT): 460 MS
QTC FREDERICIA: 438 MS
R AXIS: 10 DEGREES
RBC # BLD AUTO: 4.35 X10*6/UL (ref 4.5–5.9)
SALICYLATES SERPL-MCNC: <3 MG/DL
SARS-COV-2 RNA RESP QL NAA+PROBE: NOT DETECTED
SODIUM SERPL-SCNC: 139 MMOL/L (ref 136–145)
T AXIS: 4 DEGREES
T OFFSET: 453 MS
TRIGL SERPL-MCNC: 1259 MG/DL (ref 0–149)
VENTRICULAR RATE: 80 BPM
WBC # BLD AUTO: 4.8 X10*3/UL (ref 4.4–11.3)

## 2024-03-25 PROCEDURE — 2500000004 HC RX 250 GENERAL PHARMACY W/ HCPCS (ALT 636 FOR OP/ED): Performed by: INTERNAL MEDICINE

## 2024-03-25 PROCEDURE — 82077 ASSAY SPEC XCP UR&BREATH IA: CPT | Performed by: INTERNAL MEDICINE

## 2024-03-25 PROCEDURE — 80053 COMPREHEN METABOLIC PANEL: CPT | Performed by: EMERGENCY MEDICINE

## 2024-03-25 PROCEDURE — 87635 SARS-COV-2 COVID-19 AMP PRB: CPT | Performed by: EMERGENCY MEDICINE

## 2024-03-25 PROCEDURE — 85025 COMPLETE CBC W/AUTO DIFF WBC: CPT | Performed by: EMERGENCY MEDICINE

## 2024-03-25 PROCEDURE — 93005 ELECTROCARDIOGRAM TRACING: CPT

## 2024-03-25 PROCEDURE — 3E033XZ INTRODUCTION OF VASOPRESSOR INTO PERIPHERAL VEIN, PERCUTANEOUS APPROACH: ICD-10-PCS | Performed by: INTERNAL MEDICINE

## 2024-03-25 PROCEDURE — 2500000005 HC RX 250 GENERAL PHARMACY W/O HCPCS

## 2024-03-25 PROCEDURE — 2500000004 HC RX 250 GENERAL PHARMACY W/ HCPCS (ALT 636 FOR OP/ED)

## 2024-03-25 PROCEDURE — 2020000001 HC ICU ROOM DAILY

## 2024-03-25 PROCEDURE — 99223 1ST HOSP IP/OBS HIGH 75: CPT | Performed by: INTERNAL MEDICINE

## 2024-03-25 PROCEDURE — 2500000001 HC RX 250 WO HCPCS SELF ADMINISTERED DRUGS (ALT 637 FOR MEDICARE OP): Performed by: INTERNAL MEDICINE

## 2024-03-25 PROCEDURE — 96374 THER/PROPH/DIAG INJ IV PUSH: CPT

## 2024-03-25 PROCEDURE — 99418 PROLNG IP/OBS E/M EA 15 MIN: CPT

## 2024-03-25 PROCEDURE — 83735 ASSAY OF MAGNESIUM: CPT | Performed by: INTERNAL MEDICINE

## 2024-03-25 PROCEDURE — 36415 COLL VENOUS BLD VENIPUNCTURE: CPT | Performed by: EMERGENCY MEDICINE

## 2024-03-25 PROCEDURE — 99285 EMERGENCY DEPT VISIT HI MDM: CPT | Mod: 25,CS

## 2024-03-25 PROCEDURE — 87502 INFLUENZA DNA AMP PROBE: CPT | Performed by: INTERNAL MEDICINE

## 2024-03-25 PROCEDURE — 80143 DRUG ASSAY ACETAMINOPHEN: CPT | Performed by: EMERGENCY MEDICINE

## 2024-03-25 PROCEDURE — 84478 ASSAY OF TRIGLYCERIDES: CPT | Performed by: INTERNAL MEDICINE

## 2024-03-25 PROCEDURE — 99223 1ST HOSP IP/OBS HIGH 75: CPT

## 2024-03-25 PROCEDURE — 99291 CRITICAL CARE FIRST HOUR: CPT | Mod: CS | Performed by: EMERGENCY MEDICINE

## 2024-03-25 PROCEDURE — 36415 COLL VENOUS BLD VENIPUNCTURE: CPT | Performed by: INTERNAL MEDICINE

## 2024-03-25 RX ORDER — FENOFIBRATE 145 MG/1
145 TABLET, FILM COATED ORAL DAILY
Status: DISCONTINUED | OUTPATIENT
Start: 2024-03-25 | End: 2024-03-27 | Stop reason: HOSPADM

## 2024-03-25 RX ORDER — ICOSAPENT ETHYL 0.5 G/1
2 CAPSULE ORAL
Status: DISCONTINUED | OUTPATIENT
Start: 2024-03-25 | End: 2024-03-25

## 2024-03-25 RX ORDER — LANOLIN ALCOHOL/MO/W.PET/CERES
100 CREAM (GRAM) TOPICAL DAILY
Status: DISCONTINUED | OUTPATIENT
Start: 2024-03-28 | End: 2024-03-27 | Stop reason: HOSPADM

## 2024-03-25 RX ORDER — NOREPINEPHRINE BITARTRATE/D5W 8 MG/250ML
PLASTIC BAG, INJECTION (ML) INTRAVENOUS
Status: COMPLETED
Start: 2024-03-25 | End: 2024-03-25

## 2024-03-25 RX ORDER — ALBUTEROL SULFATE 90 UG/1
2 AEROSOL, METERED RESPIRATORY (INHALATION) EVERY 4 HOURS PRN
Status: DISCONTINUED | OUTPATIENT
Start: 2024-03-25 | End: 2024-03-27 | Stop reason: HOSPADM

## 2024-03-25 RX ORDER — BUPROPION HYDROCHLORIDE 75 MG/1
75 TABLET ORAL 2 TIMES DAILY
Status: DISCONTINUED | OUTPATIENT
Start: 2024-03-25 | End: 2024-03-27 | Stop reason: HOSPADM

## 2024-03-25 RX ORDER — PANTOPRAZOLE SODIUM 40 MG/1
40 TABLET, DELAYED RELEASE ORAL DAILY
Status: DISCONTINUED | OUTPATIENT
Start: 2024-03-25 | End: 2024-03-27 | Stop reason: HOSPADM

## 2024-03-25 RX ORDER — POTASSIUM CHLORIDE 14.9 MG/ML
20 INJECTION INTRAVENOUS
Status: DISPENSED | OUTPATIENT
Start: 2024-03-25 | End: 2024-03-25

## 2024-03-25 RX ORDER — LORAZEPAM 2 MG/ML
0.5 INJECTION INTRAMUSCULAR EVERY 2 HOUR PRN
Status: DISCONTINUED | OUTPATIENT
Start: 2024-03-25 | End: 2024-03-27 | Stop reason: HOSPADM

## 2024-03-25 RX ORDER — NOREPINEPHRINE BITARTRATE/D5W 8 MG/250ML
0-3 PLASTIC BAG, INJECTION (ML) INTRAVENOUS CONTINUOUS
Status: DISCONTINUED | OUTPATIENT
Start: 2024-03-25 | End: 2024-03-25

## 2024-03-25 RX ORDER — MONTELUKAST SODIUM 10 MG/1
10 TABLET ORAL NIGHTLY
Status: DISCONTINUED | OUTPATIENT
Start: 2024-03-25 | End: 2024-03-27 | Stop reason: HOSPADM

## 2024-03-25 RX ORDER — CHOLECALCIFEROL (VITAMIN D3) 125 MCG
125 CAPSULE ORAL DAILY
COMMUNITY

## 2024-03-25 RX ORDER — MULTIVIT-MIN/IRON FUM/FOLIC AC 7.5 MG-4
1 TABLET ORAL DAILY
Status: DISCONTINUED | OUTPATIENT
Start: 2024-03-25 | End: 2024-03-27 | Stop reason: HOSPADM

## 2024-03-25 RX ORDER — ATORVASTATIN CALCIUM 40 MG/1
80 TABLET, FILM COATED ORAL NIGHTLY
Status: DISCONTINUED | OUTPATIENT
Start: 2024-03-25 | End: 2024-03-27 | Stop reason: HOSPADM

## 2024-03-25 RX ORDER — ACETAMINOPHEN 500 MG
5000 TABLET ORAL DAILY
Status: DISCONTINUED | OUTPATIENT
Start: 2024-03-25 | End: 2024-03-27 | Stop reason: HOSPADM

## 2024-03-25 RX ORDER — LORAZEPAM 2 MG/ML
1 INJECTION INTRAMUSCULAR EVERY 2 HOUR PRN
Status: DISCONTINUED | OUTPATIENT
Start: 2024-03-25 | End: 2024-03-27 | Stop reason: HOSPADM

## 2024-03-25 RX ORDER — POTASSIUM CHLORIDE 14.9 MG/ML
INJECTION INTRAVENOUS
Status: COMPLETED
Start: 2024-03-25 | End: 2024-03-25

## 2024-03-25 RX ORDER — THIAMINE HYDROCHLORIDE 100 MG/ML
100 INJECTION, SOLUTION INTRAMUSCULAR; INTRAVENOUS DAILY
Status: DISCONTINUED | OUTPATIENT
Start: 2024-03-25 | End: 2024-03-27 | Stop reason: HOSPADM

## 2024-03-25 RX ORDER — ACETAMINOPHEN 325 MG/1
650 TABLET ORAL EVERY 4 HOURS PRN
Status: DISCONTINUED | OUTPATIENT
Start: 2024-03-25 | End: 2024-03-27 | Stop reason: HOSPADM

## 2024-03-25 RX ORDER — LORAZEPAM 2 MG/ML
2 INJECTION INTRAMUSCULAR EVERY 2 HOUR PRN
Status: DISCONTINUED | OUTPATIENT
Start: 2024-03-25 | End: 2024-03-27 | Stop reason: HOSPADM

## 2024-03-25 RX ORDER — VENLAFAXINE HYDROCHLORIDE 150 MG/1
150 CAPSULE, EXTENDED RELEASE ORAL DAILY
Status: DISCONTINUED | OUTPATIENT
Start: 2024-03-25 | End: 2024-03-27 | Stop reason: HOSPADM

## 2024-03-25 RX ORDER — SODIUM CHLORIDE, SODIUM LACTATE, POTASSIUM CHLORIDE, CALCIUM CHLORIDE 600; 310; 30; 20 MG/100ML; MG/100ML; MG/100ML; MG/100ML
100 INJECTION, SOLUTION INTRAVENOUS CONTINUOUS
Status: DISCONTINUED | OUTPATIENT
Start: 2024-03-25 | End: 2024-03-26

## 2024-03-25 RX ORDER — ICOSAPENT ETHYL 1 G/1
2 CAPSULE ORAL
Status: DISCONTINUED | OUTPATIENT
Start: 2024-03-25 | End: 2024-03-27 | Stop reason: HOSPADM

## 2024-03-25 RX ORDER — ONDANSETRON HYDROCHLORIDE 2 MG/ML
4 INJECTION, SOLUTION INTRAVENOUS EVERY 6 HOURS PRN
Status: DISCONTINUED | OUTPATIENT
Start: 2024-03-25 | End: 2024-03-27 | Stop reason: HOSPADM

## 2024-03-25 RX ORDER — FOLIC ACID 1 MG/1
1 TABLET ORAL DAILY
Status: DISCONTINUED | OUTPATIENT
Start: 2024-03-25 | End: 2024-03-27 | Stop reason: HOSPADM

## 2024-03-25 RX ADMIN — ICOSAPENT ETHYL 2 G: 1 CAPSULE ORAL at 17:05

## 2024-03-25 RX ADMIN — VENLAFAXINE HYDROCHLORIDE 150 MG: 150 CAPSULE, EXTENDED RELEASE ORAL at 13:21

## 2024-03-25 RX ADMIN — PANTOPRAZOLE SODIUM 40 MG: 40 TABLET, DELAYED RELEASE ORAL at 09:30

## 2024-03-25 RX ADMIN — BUPROPION HYDROCHLORIDE 75 MG: 75 TABLET, FILM COATED ORAL at 20:43

## 2024-03-25 RX ADMIN — ONDANSETRON 4 MG: 2 INJECTION INTRAMUSCULAR; INTRAVENOUS at 13:17

## 2024-03-25 RX ADMIN — ATORVASTATIN CALCIUM 80 MG: 40 TABLET, FILM COATED ORAL at 20:43

## 2024-03-25 RX ADMIN — FENOFIBRATE 145 MG: 145 TABLET ORAL at 09:31

## 2024-03-25 RX ADMIN — SODIUM CHLORIDE, POTASSIUM CHLORIDE, SODIUM LACTATE AND CALCIUM CHLORIDE 1000 ML: 600; 310; 30; 20 INJECTION, SOLUTION INTRAVENOUS at 08:16

## 2024-03-25 RX ADMIN — SODIUM CHLORIDE, POTASSIUM CHLORIDE, SODIUM LACTATE AND CALCIUM CHLORIDE 100 ML/HR: 600; 310; 30; 20 INJECTION, SOLUTION INTRAVENOUS at 08:45

## 2024-03-25 RX ADMIN — POTASSIUM CHLORIDE: 14.9 INJECTION, SOLUTION INTRAVENOUS at 08:45

## 2024-03-25 RX ADMIN — LORAZEPAM 2 MG: 2 INJECTION INTRAMUSCULAR; INTRAVENOUS at 13:29

## 2024-03-25 RX ADMIN — THIAMINE HYDROCHLORIDE 100 MG: 100 INJECTION, SOLUTION INTRAMUSCULAR; INTRAVENOUS at 09:30

## 2024-03-25 RX ADMIN — FOLIC ACID 1 MG: 1 TABLET ORAL at 09:31

## 2024-03-25 RX ADMIN — BUPROPION HYDROCHLORIDE 75 MG: 75 TABLET, FILM COATED ORAL at 13:21

## 2024-03-25 RX ADMIN — POTASSIUM CHLORIDE 20 MEQ: 14.9 INJECTION, SOLUTION INTRAVENOUS at 09:26

## 2024-03-25 RX ADMIN — NOREPINEPHRINE BITARTRATE 0.01 MCG/KG/MIN: 8 INJECTION, SOLUTION INTRAVENOUS at 04:00

## 2024-03-25 RX ADMIN — MONTELUKAST 10 MG: 10 TABLET, FILM COATED ORAL at 20:43

## 2024-03-25 RX ADMIN — ICOSAPENT ETHYL 2 G: 1 CAPSULE ORAL at 09:31

## 2024-03-25 RX ADMIN — CHOLECALCIFEROL TAB 125 MCG (5000 UNIT) 5000 UNITS: 125 TAB at 13:21

## 2024-03-25 RX ADMIN — Medication 0.01 MCG/KG/MIN: at 04:00

## 2024-03-25 RX ADMIN — Medication 1 TABLET: at 09:31

## 2024-03-25 ASSESSMENT — LIFESTYLE VARIABLES
NAUSEA AND VOMITING: NO NAUSEA AND NO VOMITING
AGITATION: SOMEWHAT MORE THAN NORMAL ACTIVITY
TREMOR: NO TREMOR
ANXIETY: MILDLY ANXIOUS
PAROXYSMAL SWEATS: BARELY PERCEPTIBLE SWEATING, PALMS MOIST
PAROXYSMAL SWEATS: NO SWEAT VISIBLE
NAUSEA AND VOMITING: NO NAUSEA AND NO VOMITING
PAROXYSMAL SWEATS: NO SWEAT VISIBLE
VISUAL DISTURBANCES: NOT PRESENT
ANXIETY: MILDLY ANXIOUS
ANXIETY: MILDLY ANXIOUS
AGITATION: NORMAL ACTIVITY
PAROXYSMAL SWEATS: BARELY PERCEPTIBLE SWEATING, PALMS MOIST
TOTAL SCORE: 1
AGITATION: NORMAL ACTIVITY
VISUAL DISTURBANCES: NOT PRESENT
VISUAL DISTURBANCES: NOT PRESENT
ORIENTATION AND CLOUDING OF SENSORIUM: ORIENTED AND CAN DO SERIAL ADDITIONS
ORIENTATION AND CLOUDING OF SENSORIUM: ORIENTED AND CAN DO SERIAL ADDITIONS
TOTAL SCORE: 1
VISUAL DISTURBANCES: NOT PRESENT
NAUSEA AND VOMITING: MILD NAUSEA WITH NO VOMITING
HEADACHE, FULLNESS IN HEAD: NOT PRESENT
TOTAL SCORE: 1
PAROXYSMAL SWEATS: NO SWEAT VISIBLE
TREMOR: 2
ORIENTATION AND CLOUDING OF SENSORIUM: ORIENTED AND CAN DO SERIAL ADDITIONS
TOTAL SCORE: 1
NAUSEA AND VOMITING: 5
ORIENTATION AND CLOUDING OF SENSORIUM: ORIENTED AND CAN DO SERIAL ADDITIONS
ORIENTATION AND CLOUDING OF SENSORIUM: ORIENTED AND CAN DO SERIAL ADDITIONS
TACTILE DISTURBANCES: VERY MILD ITCHING, PINS AND NEEDLES, BURNING OR NUMBNESS
NAUSEA AND VOMITING: NO NAUSEA AND NO VOMITING
PAROXYSMAL SWEATS: BARELY PERCEPTIBLE SWEATING, PALMS MOIST
HEADACHE, FULLNESS IN HEAD: NOT PRESENT
HEADACHE, FULLNESS IN HEAD: NOT PRESENT
ORIENTATION AND CLOUDING OF SENSORIUM: ORIENTED AND CAN DO SERIAL ADDITIONS
AGITATION: NORMAL ACTIVITY
TOTAL SCORE: 0
TOTAL SCORE: 5
AUDITORY DISTURBANCES: NOT PRESENT
AGITATION: NORMAL ACTIVITY
NAUSEA AND VOMITING: MILD NAUSEA WITH NO VOMITING
AUDITORY DISTURBANCES: NOT PRESENT
TREMOR: NO TREMOR
TOTAL SCORE: 6
NAUSEA AND VOMITING: NO NAUSEA AND NO VOMITING
VISUAL DISTURBANCES: NOT PRESENT
AGITATION: NORMAL ACTIVITY
ORIENTATION AND CLOUDING OF SENSORIUM: ORIENTED AND CAN DO SERIAL ADDITIONS
ANXIETY: NO ANXIETY, AT EASE
ORIENTATION AND CLOUDING OF SENSORIUM: ORIENTED AND CAN DO SERIAL ADDITIONS
AUDITORY DISTURBANCES: NOT PRESENT
ANXIETY: MILDLY ANXIOUS
ANXIETY: MILDLY ANXIOUS
ANXIETY: NO ANXIETY, AT EASE
TREMOR: 2
ANXIETY: MILDLY ANXIOUS
TREMOR: NO TREMOR
HEADACHE, FULLNESS IN HEAD: NOT PRESENT
HEADACHE, FULLNESS IN HEAD: NOT PRESENT
ANXIETY: MILDLY ANXIOUS
TREMOR: 3
AGITATION: NORMAL ACTIVITY
HEADACHE, FULLNESS IN HEAD: NOT PRESENT
NAUSEA AND VOMITING: 2
HEADACHE, FULLNESS IN HEAD: NOT PRESENT
AUDITORY DISTURBANCES: NOT PRESENT
VISUAL DISTURBANCES: NOT PRESENT
HEADACHE, FULLNESS IN HEAD: NOT PRESENT
AUDITORY DISTURBANCES: NOT PRESENT
AGITATION: NORMAL ACTIVITY
PAROXYSMAL SWEATS: NO SWEAT VISIBLE
ORIENTATION AND CLOUDING OF SENSORIUM: ORIENTED AND CAN DO SERIAL ADDITIONS
HEADACHE, FULLNESS IN HEAD: NOT PRESENT
VISUAL DISTURBANCES: NOT PRESENT
TOTAL SCORE: 1
AGITATION: NORMAL ACTIVITY
NAUSEA AND VOMITING: NO NAUSEA AND NO VOMITING
AUDITORY DISTURBANCES: NOT PRESENT
VISUAL DISTURBANCES: NOT PRESENT
ANXIETY: 2
TREMOR: NOT VISIBLE, BUT CAN BE FELT FINGERTIP TO FINGERTIP
HEADACHE, FULLNESS IN HEAD: VERY MILD
PAROXYSMAL SWEATS: 2
ORIENTATION AND CLOUDING OF SENSORIUM: ORIENTED AND CAN DO SERIAL ADDITIONS
VISUAL DISTURBANCES: NOT PRESENT
AUDITORY DISTURBANCES: NOT PRESENT
TACTILE DISTURBANCES: VERY MILD ITCHING, PINS AND NEEDLES, BURNING OR NUMBNESS
PAROXYSMAL SWEATS: NO SWEAT VISIBLE
TREMOR: NO TREMOR
AUDITORY DISTURBANCES: NOT PRESENT
AGITATION: NORMAL ACTIVITY
TOTAL SCORE: 13
TOTAL SCORE: 6
AUDITORY DISTURBANCES: NOT PRESENT
VISUAL DISTURBANCES: NOT PRESENT
AUDITORY DISTURBANCES: NOT PRESENT
TREMOR: NO TREMOR
PAROXYSMAL SWEATS: NO SWEAT VISIBLE
TREMOR: NO TREMOR
NAUSEA AND VOMITING: NO NAUSEA AND NO VOMITING

## 2024-03-25 ASSESSMENT — PAIN SCALES - GENERAL
PAINLEVEL_OUTOF10: 0 - NO PAIN

## 2024-03-25 ASSESSMENT — COLUMBIA-SUICIDE SEVERITY RATING SCALE - C-SSRS
6. HAVE YOU EVER DONE ANYTHING, STARTED TO DO ANYTHING, OR PREPARED TO DO ANYTHING TO END YOUR LIFE?: YES
1. IN THE PAST MONTH, HAVE YOU WISHED YOU WERE DEAD OR WISHED YOU COULD GO TO SLEEP AND NOT WAKE UP?: NO
6. HAVE YOU EVER DONE ANYTHING, STARTED TO DO ANYTHING, OR PREPARED TO DO ANYTHING TO END YOUR LIFE?: YES
2. HAVE YOU ACTUALLY HAD ANY THOUGHTS OF KILLING YOURSELF?: NO

## 2024-03-25 ASSESSMENT — ENCOUNTER SYMPTOMS
BACK PAIN: 1
CARDIOVASCULAR NEGATIVE: 1
MUSCULOSKELETAL NEGATIVE: 1
CHEST TIGHTNESS: 0
ENDOCRINE NEGATIVE: 1
NAUSEA: 0
NEUROLOGICAL NEGATIVE: 1
DYSPHORIC MOOD: 1
CONSTITUTIONAL NEGATIVE: 1
NERVOUS/ANXIOUS: 1
HEMATURIA: 0
VOMITING: 0
EYES NEGATIVE: 1
ACTIVITY CHANGE: 0
HEMATOLOGIC/LYMPHATIC NEGATIVE: 1
HEADACHES: 0
RESPIRATORY NEGATIVE: 1
FACIAL SWELLING: 0
ALLERGIC/IMMUNOLOGIC NEGATIVE: 1
GASTROINTESTINAL NEGATIVE: 1
SHORTNESS OF BREATH: 0
ABDOMINAL PAIN: 0

## 2024-03-25 ASSESSMENT — PAIN - FUNCTIONAL ASSESSMENT
PAIN_FUNCTIONAL_ASSESSMENT: 0-10

## 2024-03-25 NOTE — PROGRESS NOTES
Gucci Winters is a 48 y.o. male admitted for Intentional drug overdose, initial encounter (CMS/[facility]). Pharmacy reviewed the patient's [id number] medications and allergies for accuracy.    The list below reflects the PTA list prior to pharmacy medication history. A summary a changes to the PTA medication list has been listed below. Please review each medication in order reconciliation for additional clarification and justification.    Source of information:  PATIENT    Medications added:    Medications modified  ALBUTEROL 90 -->PRN    Medications to be removed:  PREDNISONE DOSE PACK  CYCLOBENZAPRINE  VITAMIN D 50,000  MONTELUKAST  OMEGA 3    Medications of concern:      Prior to Admission Medications   Prescriptions Last Dose Informant Patient Reported? Taking?   QuickVue At-Home COVID-19 Test kit   Yes    Sig: COVID TEST   albuterol 90 mcg/actuation inhaler   Yes    Sig: Inhale.   amitriptyline (Elavil) 10 mg tablet   Yes    Sig: Take 1 tablet (10 mg) by mouth once daily as needed.   atorvastatin (Lipitor) 80 mg tablet   No    Sig: Take 1 tablet (80 mg) by mouth once daily at bedtime.   buPROPion (Wellbutrin) 75 mg tablet   No    Sig: Take 1 tablet (75 mg) by mouth 2 times a day.   cyclobenzaprine (Flexeril) 10 mg tablet   Yes    Sig: Take 1 tablet (10 mg) by mouth as needed at bedtime.   ergocalciferol (Vitamin D-2) 1.25 MG (77680 UT) capsule   Yes    Sig: Take by mouth.   fenofibrate (Tricor) 145 mg tablet   No    Sig: Take 1 tablet (145 mg) by mouth once daily.   icosapent ethyL (Vascepa) 1 gram capsule   No    Sig: Take 2 capsules (2 g) by mouth 2 times a day with meals.   losartan-hydrochlorothiazide (Hyzaar) 100-25 mg tablet   No    Sig: Take 1 tablet by mouth once daily.   montelukast (Singulair) 10 mg tablet   Yes    Sig: Take by mouth.   nebivolol (Bystolic) 10 mg tablet   No    Sig: Take 1 tablet (10 mg) by mouth once daily.   omega-3 acid ethyl esters (Lovaza) 1 gram capsule   No    Sig: Take 2  capsules (2 g) by mouth 2 times a day.   omeprazole (PriLOSEC) 20 mg DR capsule   Yes    Sig: Take by mouth.   predniSONE (Deltasone) 10 mg tablet   No    Sig: Take 4 tablets (40 mg) by mouth once daily for 1 day, THEN 3 tablets (30 mg) once daily for 1 day, THEN 2 tablets (20 mg) once daily for 1 day, THEN 1 tablet (10 mg) once daily for 1 day, THEN 0.5 tablets (5 mg) once daily for 2 days.   traZODone (Desyrel) 50 mg tablet   No    Sig: Take 1 tablet (50 mg) by mouth as needed at bedtime for sleep.   venlafaxine XR (Effexor-XR) 150 mg 24 hr capsule   No    Sig: Take 1 capsule (150 mg) by mouth once daily.      Facility-Administered Medications: None       Maryanne Dempsey

## 2024-03-25 NOTE — ED TRIAGE NOTES
Pt to ED by EMS from home c/o OD. Pt states took roughly 30 trazodone and drank half a bottle of wine about an hour and a half prior to arrival. Pt states under a lot of stress recently, denies suicidal and homicidal ideation. Pt A&OX4 on arrival, slurring words, easy to arouse but drowsy. Pt PINK slipped by [place] prior to arrival

## 2024-03-25 NOTE — CONSULTS
"Critical Care Medicine Consult      Reason For Consult  Intentional drug overdose    History Of Present Illness  Gucci Winters is a 48 y.o. male that ingested approximately 50 trazodone tablets around 11 or 11:30 PM last night associated with some \"difficulties\" that he was having with his wife.  He also drank a bottle of wine.  Denies ingesting other substances to excess or other forms of alcohol.  Did not have a suicide plan and instead says this was more spontaneous but was associated with intentional harm.  Has no prior suicide attempts; he specifically denies any prior \"cutting\" but does have history of depression.  Currently his only complaint is dry throat and being \"sleepy\" from the trazodone.    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  He seen Dr. Ware from cardiology for his hypertension and lipid management.  He last saw his primary care doctor a couple of weeks ago.    Past Medical History:   Diagnosis Date    Anxiety     Hyperlipidemia     Hypertension     Sleep apnea    Other past medical history includes a recent zoster infection over the left chest wall now basically resolved, and \"seasonal\" asthma with no inhaler use in the past 2 weeks.  Past Surgical History:   Procedure Laterality Date    OTHER SURGICAL HISTORY  07/23/2021    Vasectomy    OTHER SURGICAL HISTORY  07/23/2021    Tooth extraction     (Not in a hospital admission)    Allergies:  Patient has no known allergies.  Social History     Tobacco Use    Smoking status: Former     Packs/day: .5     Types: Cigarettes   Substance Use Topics    Alcohol use: Yes     Alcohol/week: 2.0 - 3.0 standard drinks of alcohol     Types: 2 - 3 Glasses of wine per week     Comment: .5 bottle wine a day    Drug use: Not Currently     No family history on file.  Family history reviewed and noncontributory to the present illness.    Scheduled Medications: "   atorvastatin, 80 mg, oral, Nightly  fenofibrate, 145 mg, oral, Daily  folic acid, 1 mg, oral, Daily  icosapent ethyL, 2 g, oral, BID with meals  lactated Ringer's, 1,000 mL, intravenous, Once  montelukast, 10 mg, oral, Nightly  multivitamin with minerals, 1 tablet, oral, Daily  pantoprazole, 40 mg, oral, Daily  potassium chloride, 20 mEq, intravenous, q2h  [START ON 3/28/2024] thiamine, 100 mg, oral, Daily  thiamine, 100 mg, intravenous, Daily         Continuous Medications:   norepinephrine, 0-3 mcg/kg/min, Last Rate: 0.06 mcg/kg/min (03/25/24 0630)         PRN Medications:   PRN medications: acetaminophen, albuterol, LORazepam **OR** LORazepam **OR** LORazepam, ondansetron    Review of Systems:  Review of Systems   Constitutional:  Negative for activity change.   HENT:  Negative for congestion and facial swelling.    Eyes:  Negative for visual disturbance.   Respiratory:  Negative for chest tightness and shortness of breath.    Cardiovascular:  Negative for chest pain and leg swelling.   Gastrointestinal:  Negative for abdominal pain, nausea and vomiting.   Genitourinary:  Negative for hematuria.   Musculoskeletal:  Positive for back pain (History of).   Skin:  Negative for rash.   Neurological:  Negative for headaches.   All other systems reviewed and are negative.       Objective   Vitals:  Most Recent:  Vitals:    03/25/24 0634   BP: 88/51   Pulse: 74   Resp: 12   Temp:    SpO2: 97%       24hr Min/Max:  Temp  Min: 36.8 °C (98.2 °F)  Max: 36.8 °C (98.2 °F)  Pulse  Min: 70  Max: 84  BP  Min: 73/41  Max: 110/62  Resp  Min: 11  Max: 16  SpO2  Min: 95 %  Max: 100 %        No intake or output data in the 24 hours ending 03/25/24 0756        Physical exam:    Physical Exam  Vitals reviewed.   Constitutional:       General: He is not in acute distress.     Appearance: He is not ill-appearing or toxic-appearing.      Comments: Asleep when I entered the room, but easily arousable and conversant.  He is very pleasant  and cooperative.   HENT:      Head: Normocephalic and atraumatic.      Nose: No congestion.      Mouth/Throat:      Mouth: Mucous membranes are dry.      Pharynx: No oropharyngeal exudate.   Eyes:      General: No scleral icterus.  Cardiovascular:      Rate and Rhythm: Normal rate and regular rhythm.      Heart sounds: No murmur heard.  Pulmonary:      Effort: No respiratory distress.      Breath sounds: Normal breath sounds. No wheezing or rhonchi.   Chest:      Chest wall: No tenderness.   Abdominal:      General: There is no distension.      Palpations: Abdomen is soft. There is no mass.   Musculoskeletal:         General: No swelling. Normal range of motion.      Cervical back: Neck supple. No rigidity.   Skin:     Findings: No rash.   Neurological:      General: No focal deficit present.      Mental Status: He is oriented to person, place, and time.      Cranial Nerves: No cranial nerve deficit.          Lab/Radiology/Diagnostic Review:  Results for orders placed or performed during the hospital encounter of 03/25/24 (from the past 24 hour(s))   CBC and Auto Differential   Result Value Ref Range    WBC 4.8 4.4 - 11.3 x10*3/uL    nRBC 0.0 0.0 - 0.0 /100 WBCs    RBC 4.35 (L) 4.50 - 5.90 x10*6/uL    Hemoglobin 15.2 13.5 - 17.5 g/dL    Hematocrit 41.5 41.0 - 52.0 %    MCV 95 80 - 100 fL    MCH 34.9 (H) 26.0 - 34.0 pg    MCHC 36.6 (H) 32.0 - 36.0 g/dL    RDW 13.3 11.5 - 14.5 %    Platelets 202 150 - 450 x10*3/uL    Neutrophils % 52.0 40.0 - 80.0 %    Immature Granulocytes %, Automated 0.2 0.0 - 0.9 %    Lymphocytes % 40.5 13.0 - 44.0 %    Monocytes % 6.1 2.0 - 10.0 %    Eosinophils % 0.8 0.0 - 6.0 %    Basophils % 0.4 0.0 - 2.0 %    Neutrophils Absolute 2.49 1.20 - 7.70 x10*3/uL    Immature Granulocytes Absolute, Automated 0.01 0.00 - 0.70 x10*3/uL    Lymphocytes Absolute 1.94 1.20 - 4.80 x10*3/uL    Monocytes Absolute 0.29 0.10 - 1.00 x10*3/uL    Eosinophils Absolute 0.04 0.00 - 0.70 x10*3/uL    Basophils Absolute  0.02 0.00 - 0.10 x10*3/uL   Comprehensive Metabolic Panel   Result Value Ref Range    Glucose 104 (H) 74 - 99 mg/dL    Sodium 139 136 - 145 mmol/L    Potassium 3.4 (L) 3.5 - 5.3 mmol/L    Chloride 100 98 - 107 mmol/L    Bicarbonate 29 21 - 32 mmol/L    Anion Gap 13 10 - 20 mmol/L    Urea Nitrogen 15 6 - 23 mg/dL    Creatinine 0.86 0.50 - 1.30 mg/dL    eGFR >90 >60 mL/min/1.73m*2    Calcium 9.0 8.6 - 10.3 mg/dL    Albumin 4.1 3.4 - 5.0 g/dL    Alkaline Phosphatase 74 33 - 120 U/L    Total Protein 7.1 6.4 - 8.2 g/dL    AST 34 9 - 39 U/L    Bilirubin, Total 0.8 0.0 - 1.2 mg/dL    ALT 29 10 - 52 U/L   Acute Toxicology Panel, Blood   Result Value Ref Range    Acetaminophen <10.0 10.0 - 30.0 ug/mL    Salicylate  <3 4 - 20 mg/dL    Alcohol 174 (H) <=10 mg/dL   Sars-CoV-2 PCR   Result Value Ref Range    Coronavirus 2019, PCR Not Detected Not Detected   Magnesium   Result Value Ref Range    Magnesium 1.91 1.60 - 2.40 mg/dL   Electrocardiogram, 12-lead   Result Value Ref Range    Ventricular Rate 80 BPM    Atrial Rate 79 BPM    NJ Interval 160 ms    QRS Duration 98 ms    QT Interval 398 ms    QTC Calculation(Bazett) 460 ms    P Axis 32 degrees    R Axis 10 degrees    T Axis 4 degrees    QRS Count 13 beats    Q Onset 254 ms    T Offset 453 ms    QTC Fredericia 438 ms     Electrocardiogram, 12-lead    Result Date: 3/25/2024  Sinus rhythm Baseline wander in lead(s) V4,V5      Assessment/Plan   Principal Problem:    Intentional drug overdose, initial encounter (CMS/[facility])  Trazodone overdose reportedly took 50, 50 mg tablets now about 9 hours ago.  Nothing particularly to do for this at the moment.    Reportedly had some low blood pressure.  Currently blood pressure 124/70 on 0.03 mcg/kg/min of norepinephrine, initially instituted by the emergency department through a peripheral line in the left upper arm.    History of anxiety/depression.  Does need psychiatric evaluation and treatment.    History of hyperlipidemia with severe  hypertriglyceridemia and hypertension.  Will recheck triglyceride levels and restart chronic therapy, holding antihypertensive medications for now.  Fluid bolus and encourage liberal p.o. intake.    History of asthma.  Seems to be mild intermittent disease.  Lungs are clear.  He has not used his inhaler lately at all.  Can continue montelukast 10 mg daily.    Hypokalemia.  Supplement and allow p.o. intake.    Elevated EtOH level.  Patient was drinking wine yesterday.  Will recheck level and track labs.  Check magnesium.  Thiamine.    Dragon dictation software was used to dictate this note and thus there may be minor errors in translation/transcription including garbled speech or misspellings. Please contact for clarification if needed. Inpatient consult to Intensivist  Consult performed by: Dereje Murphy MD  Consult ordered by: DO Dereje Burrows MD

## 2024-03-25 NOTE — H&P
History Of Present Illness  Gucci Winters is a 48 y.o. male with past medical history of depression/anxiety, hyperlipidemia, alcohol dependence drinking almost a bottle of wine per day presented to emergency department for ingestion of 50 trazodone due to stressful situation at home.  Patient also drank a bottle of wine on top of that.  Patient denies any active suicidal or homicidal ideation.  Poison control was contacted from ER and they recommended monitoring.  Patient was also hypotensive and was briefly started on Levophed which by the time I saw him in ICU was completely weaned off.  He just reports feeling sleepy and nothing else.  He states that he is going to look into counseling regarding situation with his wife.     Past Medical History  He has a past medical history of Anxiety, Hyperlipidemia, Hypertension, and Sleep apnea.    Surgical History  He has a past surgical history that includes Other surgical history (07/23/2021) and Other surgical history (07/23/2021).     Social History  He reports that he has quit smoking. His smoking use included cigarettes. He smoked an average of .5 packs per day. He does not have any smokeless tobacco history on file. He reports current alcohol use of about 2.0 - 3.0 standard drinks of alcohol per week. He reports that he does not currently use drugs.    Family History  No family history on file.     Allergies  Patient has no known allergies.    Scheduled medications  atorvastatin, 80 mg, oral, Nightly  fenofibrate, 145 mg, oral, Daily  folic acid, 1 mg, oral, Daily  icosapent ethyL, 2 g, oral, BID with meals  montelukast, 10 mg, oral, Nightly  multivitamin with minerals, 1 tablet, oral, Daily  pantoprazole, 40 mg, oral, Daily  potassium chloride, 20 mEq, intravenous, q2h  [START ON 3/28/2024] thiamine, 100 mg, oral, Daily  thiamine, 100 mg, intravenous, Daily      Continuous medications  lactated Ringer's, 100 mL/hr      PRN medications  PRN medications: acetaminophen,  albuterol, LORazepam **OR** LORazepam **OR** LORazepam, ondansetron      Physical Exam:  General: Not in acute distress, alert but feels sleepy  HEENT: PERRLA, head intact and normocephalic  Neck: Normal to inspection  Lungs: Clear to auscultation, work of breathing within normal limit  Cardiac: Regular rate and rhythm  Abdomen: Soft nontender, positive bowel sounds  : Exam deferred  Skin: Intact  Hematology: No petechia or excessive ecchymosis  Musculoskeletal: Without significant trauma  Neurological: Alert awake oriented, no focal deficit, cranial nerves grossly intact  Psych: Currently denies any suicidal or homicidal ideation     Last Recorded Vitals  /63 (BP Location: Right arm, Patient Position: Lying)   Pulse 71   Temp 37.1 °C (98.8 °F)   Resp 14   Wt 87.1 kg (192 lb)   SpO2 97%     Relevant Results  Electrocardiogram, 12-lead    Result Date: 3/25/2024  Sinus rhythm Baseline wander in lead(s) V4,V5     Results for orders placed or performed during the hospital encounter of 03/25/24 (from the past 24 hour(s))   CBC and Auto Differential   Result Value Ref Range    WBC 4.8 4.4 - 11.3 x10*3/uL    nRBC 0.0 0.0 - 0.0 /100 WBCs    RBC 4.35 (L) 4.50 - 5.90 x10*6/uL    Hemoglobin 15.2 13.5 - 17.5 g/dL    Hematocrit 41.5 41.0 - 52.0 %    MCV 95 80 - 100 fL    MCH 34.9 (H) 26.0 - 34.0 pg    MCHC 36.6 (H) 32.0 - 36.0 g/dL    RDW 13.3 11.5 - 14.5 %    Platelets 202 150 - 450 x10*3/uL    Neutrophils % 52.0 40.0 - 80.0 %    Immature Granulocytes %, Automated 0.2 0.0 - 0.9 %    Lymphocytes % 40.5 13.0 - 44.0 %    Monocytes % 6.1 2.0 - 10.0 %    Eosinophils % 0.8 0.0 - 6.0 %    Basophils % 0.4 0.0 - 2.0 %    Neutrophils Absolute 2.49 1.20 - 7.70 x10*3/uL    Immature Granulocytes Absolute, Automated 0.01 0.00 - 0.70 x10*3/uL    Lymphocytes Absolute 1.94 1.20 - 4.80 x10*3/uL    Monocytes Absolute 0.29 0.10 - 1.00 x10*3/uL    Eosinophils Absolute 0.04 0.00 - 0.70 x10*3/uL    Basophils Absolute 0.02 0.00 - 0.10  x10*3/uL   Comprehensive Metabolic Panel   Result Value Ref Range    Glucose 104 (H) 74 - 99 mg/dL    Sodium 139 136 - 145 mmol/L    Potassium 3.4 (L) 3.5 - 5.3 mmol/L    Chloride 100 98 - 107 mmol/L    Bicarbonate 29 21 - 32 mmol/L    Anion Gap 13 10 - 20 mmol/L    Urea Nitrogen 15 6 - 23 mg/dL    Creatinine 0.86 0.50 - 1.30 mg/dL    eGFR >90 >60 mL/min/1.73m*2    Calcium 9.0 8.6 - 10.3 mg/dL    Albumin 4.1 3.4 - 5.0 g/dL    Alkaline Phosphatase 74 33 - 120 U/L    Total Protein 7.1 6.4 - 8.2 g/dL    AST 34 9 - 39 U/L    Bilirubin, Total 0.8 0.0 - 1.2 mg/dL    ALT 29 10 - 52 U/L   Acute Toxicology Panel, Blood   Result Value Ref Range    Acetaminophen <10.0 10.0 - 30.0 ug/mL    Salicylate  <3 4 - 20 mg/dL    Alcohol 174 (H) <=10 mg/dL   Sars-CoV-2 PCR   Result Value Ref Range    Coronavirus 2019, PCR Not Detected Not Detected   Magnesium   Result Value Ref Range    Magnesium 1.91 1.60 - 2.40 mg/dL   Electrocardiogram, 12-lead   Result Value Ref Range    Ventricular Rate 80 BPM    Atrial Rate 79 BPM    UT Interval 160 ms    QRS Duration 98 ms    QT Interval 398 ms    QTC Calculation(Bazett) 460 ms    P Axis 32 degrees    R Axis 10 degrees    T Axis 4 degrees    QRS Count 13 beats    Q Onset 254 ms    T Offset 453 ms    QTC Fredericia 438 ms        Assessment/Plan   Gucci Winters is a 48 y.o. male on day 0 of admission presenting with Intentional drug overdose, initial encounter (CMS/[facility]).  Principal Problem:    Intentional drug overdose, initial encounter (CMS/[facility])      Gucci Winters is a 48 y.o. male with past medical history of depression/anxiety, hyperlipidemia, alcohol dependence drinking almost a bottle of wine per day presented to emergency department for ingestion of 50 trazodone due to stressful situation at home.      Intentional overdose  Patient currently not suicidal  Low blood pressure has been corrected  Patient on just maintenance fluid  Patient was pink slipped by police department  Psychiatry  consulted here  Suicide precaution even though patient is not currently suicidal    Hypotension  Likely medication related  Resolved  Off Levophed  IV fluids    Anxiety/depression  Pending psychiatric evaluation  Effexor, Wellbutrin    Hyperlipidemia  Lipitor    Alcohol dependence  [facility] protocol  Daily folic acid, thiamine, multivitamin    DVT prophylaxis  SCDs       Plan discussed with patient at bedside in ICU room 5  Full code  High level of MDM based on above issue and discussing plan    This note is created using voice recognition software. All efforts are made to minimize errors, if there are errors there due to transcription.    Vasyl Lamas  Hospitalist

## 2024-03-25 NOTE — ED NOTES
Spoke with Deena at Poison Control. Expected observation 4-6 hours from ingestion.      Weston Luis RN  03/25/24 [id number]

## 2024-03-25 NOTE — CONSULTS
"Referring Provider: Dr. Lamas  Date: 3/25/24    Reason For Consult: Suicide attempt with intentional trazodone      Chief Complaint: \"My wife and I are at the end of our marriage\"    History Of Present Illness  Gucci Winters is a 48 y.o. year old male patient that presented to [facility] ED for intentional overdose of trazodone, admitted to ICU.  Patient reports he took approximately 27 tablets of trazodone 50mg tabs.  Patient also drank a bottle of wine throughout the day prior to the overdose, reports he drinks daily.  Does not feel he was drunk at the time of the overdose, states he probably had a \"buzz\".  Patient reports that he was in bed and his wife said something to him that set him off and he said \"eff it\", and took the pills.  When this writer asked patient if he was attempting to hurt himself he stated \"I guess I was\".  When this writer asked the patient if he was attempting to end his life he replied \"I don't think so\".  Patient reported multiple stressors at home due to his marriage and financial issues.  Patient reports he doesn't feel like he is depressed most of the time, states he feels more anxious.       Patient rates his depression at a 1/10.  Patient reports experiencing intermittent worsening feelings of depression but unable to describe for how long, states it comes and goes.  Reports he was first diagnosed with depression in 2000 when his father passed away.  Patient reports increased feelings of hopelessness.  Also reports feelings of guilt due to feeling like he can't provide for his family.  Patient denies ever having thoughts of suicide, reports this was impulsive.  Patient reports experiencing prior depressive episodes, but not manic symptoms, in the past. No hallucinations or paranoia were endorsed or noted.     Patient rates his anxiety at a 1/10.  Patient reports experiencing excessive worries about everyday activities that he can't control at times, and result in problems sleeping, " concentrating, decreased energy, irritability, and restlessness.  Patient reports he was diagnosed with anxiety also In 2000 and states he is anxious everyday.  \             PSYCHIATRIC REVIEW OF SYMPTOMS  Depressive Symptoms: depressed or irritable mood, worthlessness or guilt, and suicidal ideation or plan  Manic Symptoms: negative  Anxiety Symptoms: excessive worry Worry Symptoms: difficulty concentrating due to worry, difficulty controlling worry, irritability due to worry, restlessness or feeling on edge due to worry, and sleep disturbances due to worry  Psychotic Symptoms:  negative  Delirium/Altered Mental Status Symptoms:  negative  Other Symptoms/Concerns:  negative    Developmental Concerns:  negative  Disordered Eating Symptoms:  negative  Inattentive Symptoms:  negative  Hyperactive/Impulsive Symptoms:  negative  Conduct Issues:  negative      Past Medical History  Past Medical History:   Diagnosis Date    Anxiety     Hyperlipidemia     Hypertension     Sleep apnea         Past Psychiatric History: 1) Past Dx: depression, anxiety                                            2) No prior psychiatric hospitalizations                                            3) No prior suicide attempts                                            4) No prior SIB                                            5) No prior rehab treatment programs                                            6) Current psych meds: bupropion 75mg PO BID (x1 year), effexor xr 150mg PO QDAY (14 years).     Past Psychiatric Meds: 1) None                                               Family History: 1) Denies                             2) No known suicides in the family.    Social History  Social History     Socioeconomic History    Marital status:      Spouse name: Not on file    Number of children: Not on file    Years of education: Not on file    Highest education level: Not on file   Occupational History    Not on file   Tobacco Use    Smoking  status: Former     Packs/day: .5     Types: Cigarettes    Smokeless tobacco: Not on file   Substance and Sexual Activity    Alcohol use: Yes     Alcohol/week: 2.0 - 3.0 standard drinks of alcohol     Types: 2 - 3 Glasses of wine per week     Comment: .5 bottle wine a day    Drug use: Not Currently    Sexual activity: Not on file   Other Topics Concern    Not on file   Social History Narrative    Not on file     Social Determinants of Health     Financial Resource Strain: Not on file   Food Insecurity: Not on file   Transportation Needs: Not on file   Physical Activity: Not on file   Stress: Not on file   Social Connections: Not on file   Intimate Partner Violence: Not on file   Housing Stability: Not on file        Substance Abuse History:  1) Tobacco - Former smoker, quit 14 years ago.   2) ETOH - Daily since he was 22 years old, reports about 1/2-1 bottle of wine per day.    3) Cannabis - Denies  4) Denies any other illicit drug use.        The patient graduated high school.  Has an associates degree in occupational therapy.  Currently employed as an occupational therapist.  Currently  for about 14 years, only marriage. Three daughters, 17 year old, 14 year old, and 10 year old. No significant legal history. The patient lives with his wife and 3 daughters in [place] in an apartment.           Allergies  No Known Allergies     Scheduled medications  potassium chloride, , ,   atorvastatin, 80 mg, oral, Nightly  buPROPion, 75 mg, oral, BID  cholecalciferol, 5,000 Units, oral, Daily  fenofibrate, 145 mg, oral, Daily  folic acid, 1 mg, oral, Daily  icosapent ethyL, 2 g, oral, BID with meals  montelukast, 10 mg, oral, Nightly  multivitamin with minerals, 1 tablet, oral, Daily  pantoprazole, 40 mg, oral, Daily  [START ON 3/28/2024] thiamine, 100 mg, oral, Daily  thiamine, 100 mg, intravenous, Daily  venlafaxine XR, 150 mg, oral, Daily      Continuous medications  lactated Ringer's, 100 mL/hr, Last Rate: 100  "mL/hr (03/25/24 0845)      PRN medications  PRN medications: potassium chloride, acetaminophen, albuterol, LORazepam **OR** LORazepam **OR** LORazepam, ondansetron         Review of Systems   Review of Systems   Constitutional: Negative.    HENT: Negative.     Eyes: Negative.    Respiratory: Negative.     Cardiovascular: Negative.    Gastrointestinal: Negative.    Endocrine: Negative.    Genitourinary: Negative.    Musculoskeletal: Negative.    Skin: Negative.    Allergic/Immunologic: Negative.    Neurological: Negative.    Hematological: Negative.    Psychiatric/Behavioral:  Positive for dysphoric mood and suicidal ideas. The patient is nervous/anxious.         Physical Exam  Mental Status Exam:   General: Appropriately groomed and dressed in hospital attire.   Appearance: Appears stated age.   Attitude: Calm, cooperative.   Behavior: Appropriate eye contact.   Motor Activity: No agitation or retardation. No EPS/TD.  Unable to assess gait and station. Normal muscle tone and bulk.   Speech: Regular rate, rhythm, volume and tone, spontaneous,  fluent. Non-pressured.   Mood: \"anxious\"   Affect: Neutral.   Thought Process: Organized.    Thought Content: Presented to the ED for intentional trazodone overdose.  Does not endorse homicidal ideation.  No overt delusions or paranoia elicited.    Thought Perception: Does not endorse auditory or  visual hallucinations, does not appear to be responding to hallucinatory stimuli.   Cognition: Alert, oriented x 3. No deficits noted.  Adequate fund of knowledge. No deficit in recent and remote memory. No deficits in attention, concentration or language.   Insight: Fair, as patient recognizes symptoms of illness and need for recommended treatments.    Judgment: Impaired, as patient can not make reasonable decisions about ordinary activities of daily living and necessary medical care recommendations.       Last Recorded Vitals  Visit Vitals  /72 (BP Location: Right arm, Patient " Position: Lying)   Pulse 78   Temp 37.3 °C (99.1 °F)   Resp 13        Relevant Results  Results for orders placed or performed during the hospital encounter of 03/25/24 (from the past 24 hour(s))   CBC and Auto Differential   Result Value Ref Range    WBC 4.8 4.4 - 11.3 x10*3/uL    nRBC 0.0 0.0 - 0.0 /100 WBCs    RBC 4.35 (L) 4.50 - 5.90 x10*6/uL    Hemoglobin 15.2 13.5 - 17.5 g/dL    Hematocrit 41.5 41.0 - 52.0 %    MCV 95 80 - 100 fL    MCH 34.9 (H) 26.0 - 34.0 pg    MCHC 36.6 (H) 32.0 - 36.0 g/dL    RDW 13.3 11.5 - 14.5 %    Platelets 202 150 - 450 x10*3/uL    Neutrophils % 52.0 40.0 - 80.0 %    Immature Granulocytes %, Automated 0.2 0.0 - 0.9 %    Lymphocytes % 40.5 13.0 - 44.0 %    Monocytes % 6.1 2.0 - 10.0 %    Eosinophils % 0.8 0.0 - 6.0 %    Basophils % 0.4 0.0 - 2.0 %    Neutrophils Absolute 2.49 1.20 - 7.70 x10*3/uL    Immature Granulocytes Absolute, Automated 0.01 0.00 - 0.70 x10*3/uL    Lymphocytes Absolute 1.94 1.20 - 4.80 x10*3/uL    Monocytes Absolute 0.29 0.10 - 1.00 x10*3/uL    Eosinophils Absolute 0.04 0.00 - 0.70 x10*3/uL    Basophils Absolute 0.02 0.00 - 0.10 x10*3/uL   Comprehensive Metabolic Panel   Result Value Ref Range    Glucose 104 (H) 74 - 99 mg/dL    Sodium 139 136 - 145 mmol/L    Potassium 3.4 (L) 3.5 - 5.3 mmol/L    Chloride 100 98 - 107 mmol/L    Bicarbonate 29 21 - 32 mmol/L    Anion Gap 13 10 - 20 mmol/L    Urea Nitrogen 15 6 - 23 mg/dL    Creatinine 0.86 0.50 - 1.30 mg/dL    eGFR >90 >60 mL/min/1.73m*2    Calcium 9.0 8.6 - 10.3 mg/dL    Albumin 4.1 3.4 - 5.0 g/dL    Alkaline Phosphatase 74 33 - 120 U/L    Total Protein 7.1 6.4 - 8.2 g/dL    AST 34 9 - 39 U/L    Bilirubin, Total 0.8 0.0 - 1.2 mg/dL    ALT 29 10 - 52 U/L   Acute Toxicology Panel, Blood   Result Value Ref Range    Acetaminophen <10.0 10.0 - 30.0 ug/mL    Salicylate  <3 4 - 20 mg/dL    Alcohol 174 (H) <=10 mg/dL   Sars-CoV-2 PCR   Result Value Ref Range    Coronavirus 2019, PCR Not Detected Not Detected   Magnesium    Result Value Ref Range    Magnesium 1.91 1.60 - 2.40 mg/dL   Electrocardiogram, 12-lead   Result Value Ref Range    Ventricular Rate 80 BPM    Atrial Rate 79 BPM    MD Interval 160 ms    QRS Duration 98 ms    QT Interval 398 ms    QTC Calculation(Bazett) 460 ms    P Axis 32 degrees    R Axis 10 degrees    T Axis 4 degrees    QRS Count 13 beats    Q Onset 254 ms    T Offset 453 ms    QTC Fredericia 438 ms   Triglycerides   Result Value Ref Range    Triglycerides 1,259 (H) 0 - 149 mg/dL   Ethanol   Result Value Ref Range    Alcohol 38 (H) <=10 mg/dL               Diagnostic Impression:  1) Other specified depressive disorder   2) Generalized anxiety disorder  3) Alcohol use disorder, severe, dependence  4) Intentional trazodone overdose   5) Hyperlipidemia         Recommendations:  1) Continue 1-to-1 sitter  2) Patient meets criteria for involuntary psychiatric admission.  Patient cannot leave AMA.  If patient attempts to leave AMA, pink slip patient and notify police and protective services.    3) Once patient is medically cleared, place consult order to [facility] for placement, refer to  Aparna CRUZ.      I spent 90 minutes in the professional and overall care of this patient.        Sandy Mcclendon, APRN, CNP, PMHNP

## 2024-03-26 VITALS
HEIGHT: 67 IN | HEART RATE: 92 BPM | BODY MASS INDEX: 30.14 KG/M2 | DIASTOLIC BLOOD PRESSURE: 86 MMHG | SYSTOLIC BLOOD PRESSURE: 139 MMHG | TEMPERATURE: 97.3 F | WEIGHT: 192.02 LBS | OXYGEN SATURATION: 95 % | RESPIRATION RATE: 11 BRPM

## 2024-03-26 LAB
AMPHETAMINES UR QL SCN: NORMAL
AMPHETAMINES UR QL SCN: NORMAL
ANION GAP SERPL CALC-SCNC: 10 MMOL/L (ref 10–20)
APPEARANCE UR: CLEAR
BARBITURATES UR QL SCN: NORMAL
BARBITURATES UR QL SCN: NORMAL
BASOPHILS # BLD AUTO: 0.01 X10*3/UL (ref 0–0.1)
BASOPHILS NFR BLD AUTO: 0.2 %
BENZODIAZ UR QL SCN: NORMAL
BENZODIAZ UR QL SCN: NORMAL
BILIRUB UR STRIP.AUTO-MCNC: NEGATIVE MG/DL
BUN SERPL-MCNC: 13 MG/DL (ref 6–23)
BZE UR QL SCN: NORMAL
BZE UR QL SCN: NORMAL
CALCIUM SERPL-MCNC: 8.3 MG/DL (ref 8.6–10.3)
CANNABINOIDS UR QL SCN: NORMAL
CANNABINOIDS UR QL SCN: NORMAL
CHLORIDE SERPL-SCNC: 105 MMOL/L (ref 98–107)
CO2 SERPL-SCNC: 27 MMOL/L (ref 21–32)
COLOR UR: YELLOW
CREAT SERPL-MCNC: 0.83 MG/DL (ref 0.5–1.3)
EGFRCR SERPLBLD CKD-EPI 2021: >90 ML/MIN/1.73M*2
EOSINOPHIL # BLD AUTO: 0.05 X10*3/UL (ref 0–0.7)
EOSINOPHIL NFR BLD AUTO: 1 %
ERYTHROCYTE [DISTWIDTH] IN BLOOD BY AUTOMATED COUNT: 13.9 % (ref 11.5–14.5)
FENTANYL+NORFENTANYL UR QL SCN: NORMAL
FENTANYL+NORFENTANYL UR QL SCN: NORMAL
FLUAV RNA RESP QL NAA+PROBE: NOT DETECTED
FLUBV RNA RESP QL NAA+PROBE: NOT DETECTED
GLUCOSE SERPL-MCNC: 109 MG/DL (ref 74–99)
GLUCOSE UR STRIP.AUTO-MCNC: NEGATIVE MG/DL
HCT VFR BLD AUTO: 37.6 % (ref 41–52)
HGB BLD-MCNC: 12.9 G/DL (ref 13.5–17.5)
IMM GRANULOCYTES # BLD AUTO: 0.01 X10*3/UL (ref 0–0.7)
IMM GRANULOCYTES NFR BLD AUTO: 0.2 % (ref 0–0.9)
KETONES UR STRIP.AUTO-MCNC: NEGATIVE MG/DL
LEUKOCYTE ESTERASE UR QL STRIP.AUTO: NEGATIVE
LYMPHOCYTES # BLD AUTO: 1.97 X10*3/UL (ref 1.2–4.8)
LYMPHOCYTES NFR BLD AUTO: 37.8 %
MAGNESIUM SERPL-MCNC: 1.5 MG/DL (ref 1.6–2.4)
MCH RBC QN AUTO: 33.5 PG (ref 26–34)
MCHC RBC AUTO-ENTMCNC: 34.3 G/DL (ref 32–36)
MCV RBC AUTO: 98 FL (ref 80–100)
METHADONE UR QL SCN: NORMAL
METHADONE UR QL SCN: NORMAL
MONOCYTES # BLD AUTO: 0.38 X10*3/UL (ref 0.1–1)
MONOCYTES NFR BLD AUTO: 7.3 %
NEUTROPHILS # BLD AUTO: 2.79 X10*3/UL (ref 1.2–7.7)
NEUTROPHILS NFR BLD AUTO: 53.5 %
NITRITE UR QL STRIP.AUTO: NEGATIVE
NRBC BLD-RTO: 0 /100 WBCS (ref 0–0)
OPIATES UR QL SCN: NORMAL
OPIATES UR QL SCN: NORMAL
OXYCODONE+OXYMORPHONE UR QL SCN: NORMAL
OXYCODONE+OXYMORPHONE UR QL SCN: NORMAL
PCP UR QL SCN: NORMAL
PCP UR QL SCN: NORMAL
PH UR STRIP.AUTO: 5 [PH]
PLATELET # BLD AUTO: 169 X10*3/UL (ref 150–450)
POTASSIUM SERPL-SCNC: 3.7 MMOL/L (ref 3.5–5.3)
PROT UR STRIP.AUTO-MCNC: NEGATIVE MG/DL
RBC # BLD AUTO: 3.85 X10*6/UL (ref 4.5–5.9)
RBC # UR STRIP.AUTO: NEGATIVE /UL
SODIUM SERPL-SCNC: 138 MMOL/L (ref 136–145)
SP GR UR STRIP.AUTO: 1.02
UROBILINOGEN UR STRIP.AUTO-MCNC: <2 MG/DL
WBC # BLD AUTO: 5.2 X10*3/UL (ref 4.4–11.3)

## 2024-03-26 PROCEDURE — 2500000002 HC RX 250 W HCPCS SELF ADMINISTERED DRUGS (ALT 637 FOR MEDICARE OP, ALT 636 FOR OP/ED): Performed by: INTERNAL MEDICINE

## 2024-03-26 PROCEDURE — 2500000001 HC RX 250 WO HCPCS SELF ADMINISTERED DRUGS (ALT 637 FOR MEDICARE OP): Performed by: INTERNAL MEDICINE

## 2024-03-26 PROCEDURE — 80307 DRUG TEST PRSMV CHEM ANLYZR: CPT | Performed by: EMERGENCY MEDICINE

## 2024-03-26 PROCEDURE — 2500000004 HC RX 250 GENERAL PHARMACY W/ HCPCS (ALT 636 FOR OP/ED): Performed by: INTERNAL MEDICINE

## 2024-03-26 PROCEDURE — 81003 URINALYSIS AUTO W/O SCOPE: CPT | Performed by: EMERGENCY MEDICINE

## 2024-03-26 PROCEDURE — 83735 ASSAY OF MAGNESIUM: CPT | Performed by: INTERNAL MEDICINE

## 2024-03-26 PROCEDURE — 99239 HOSP IP/OBS DSCHRG MGMT >30: CPT | Performed by: INTERNAL MEDICINE

## 2024-03-26 PROCEDURE — 36415 COLL VENOUS BLD VENIPUNCTURE: CPT | Performed by: INTERNAL MEDICINE

## 2024-03-26 PROCEDURE — 85025 COMPLETE CBC W/AUTO DIFF WBC: CPT | Performed by: INTERNAL MEDICINE

## 2024-03-26 PROCEDURE — 99233 SBSQ HOSP IP/OBS HIGH 50: CPT | Performed by: INTERNAL MEDICINE

## 2024-03-26 PROCEDURE — 80048 BASIC METABOLIC PNL TOTAL CA: CPT | Performed by: INTERNAL MEDICINE

## 2024-03-26 PROCEDURE — 80307 DRUG TEST PRSMV CHEM ANLYZR: CPT | Performed by: INTERNAL MEDICINE

## 2024-03-26 PROCEDURE — 99231 SBSQ HOSP IP/OBS SF/LOW 25: CPT

## 2024-03-26 RX ORDER — FOLIC ACID 1 MG/1
1 TABLET ORAL DAILY
Qty: 30 TABLET | Refills: 0 | Status: SHIPPED | OUTPATIENT
Start: 2024-03-27 | End: 2024-04-26

## 2024-03-26 RX ORDER — MAGNESIUM SULFATE HEPTAHYDRATE 40 MG/ML
4 INJECTION, SOLUTION INTRAVENOUS ONCE
Status: COMPLETED | OUTPATIENT
Start: 2024-03-26 | End: 2024-03-26

## 2024-03-26 RX ORDER — FLUTICASONE PROPIONATE 50 MCG
2 SPRAY, SUSPENSION (ML) NASAL DAILY
Status: DISCONTINUED | OUTPATIENT
Start: 2024-03-26 | End: 2024-03-27 | Stop reason: HOSPADM

## 2024-03-26 RX ORDER — LORAZEPAM 0.5 MG/1
0.5 TABLET ORAL ONCE
Status: COMPLETED | OUTPATIENT
Start: 2024-03-26 | End: 2024-03-26

## 2024-03-26 RX ORDER — MULTIVIT-MIN/IRON FUM/FOLIC AC 7.5 MG-4
1 TABLET ORAL DAILY
Qty: 30 TABLET | Refills: 0 | Status: SHIPPED | OUTPATIENT
Start: 2024-03-27 | End: 2024-04-26

## 2024-03-26 RX ORDER — LANOLIN ALCOHOL/MO/W.PET/CERES
100 CREAM (GRAM) TOPICAL DAILY
Qty: 30 TABLET | Refills: 0 | Status: SHIPPED | OUTPATIENT
Start: 2024-03-26 | End: 2024-04-25

## 2024-03-26 RX ADMIN — ACETAMINOPHEN 650 MG: 325 TABLET ORAL at 08:00

## 2024-03-26 RX ADMIN — PANTOPRAZOLE SODIUM 40 MG: 40 TABLET, DELAYED RELEASE ORAL at 08:07

## 2024-03-26 RX ADMIN — VENLAFAXINE HYDROCHLORIDE 150 MG: 150 CAPSULE, EXTENDED RELEASE ORAL at 08:07

## 2024-03-26 RX ADMIN — BUPROPION HYDROCHLORIDE 75 MG: 75 TABLET, FILM COATED ORAL at 08:07

## 2024-03-26 RX ADMIN — FENOFIBRATE 145 MG: 145 TABLET ORAL at 08:07

## 2024-03-26 RX ADMIN — FOLIC ACID 1 MG: 1 TABLET ORAL at 08:07

## 2024-03-26 RX ADMIN — LORAZEPAM 0.5 MG: 2 INJECTION INTRAMUSCULAR; INTRAVENOUS at 10:10

## 2024-03-26 RX ADMIN — ICOSAPENT ETHYL 2 G: 1 CAPSULE ORAL at 08:07

## 2024-03-26 RX ADMIN — LORAZEPAM 0.5 MG: 0.5 TABLET ORAL at 21:00

## 2024-03-26 RX ADMIN — Medication 1 TABLET: at 08:07

## 2024-03-26 RX ADMIN — CHOLECALCIFEROL TAB 125 MCG (5000 UNIT) 5000 UNITS: 125 TAB at 08:07

## 2024-03-26 RX ADMIN — LORAZEPAM 1 MG: 2 INJECTION INTRAMUSCULAR; INTRAVENOUS at 13:09

## 2024-03-26 RX ADMIN — FLUTICASONE PROPIONATE 2 SPRAY: 50 SPRAY, METERED NASAL at 13:32

## 2024-03-26 RX ADMIN — THIAMINE HYDROCHLORIDE 100 MG: 100 INJECTION, SOLUTION INTRAMUSCULAR; INTRAVENOUS at 08:07

## 2024-03-26 RX ADMIN — MAGNESIUM SULFATE 4 G: 4 INJECTION INTRAVENOUS at 08:08

## 2024-03-26 ASSESSMENT — LIFESTYLE VARIABLES
HEADACHE, FULLNESS IN HEAD: VERY MILD
AUDITORY DISTURBANCES: NOT PRESENT
NAUSEA AND VOMITING: NO NAUSEA AND NO VOMITING
PAROXYSMAL SWEATS: BARELY PERCEPTIBLE SWEATING, PALMS MOIST
HEADACHE, FULLNESS IN HEAD: NOT PRESENT
ORIENTATION AND CLOUDING OF SENSORIUM: ORIENTED AND CAN DO SERIAL ADDITIONS
NAUSEA AND VOMITING: NO NAUSEA AND NO VOMITING
ORIENTATION AND CLOUDING OF SENSORIUM: ORIENTED AND CAN DO SERIAL ADDITIONS
PULSE: 93
VISUAL DISTURBANCES: NOT PRESENT
TREMOR: NO TREMOR
AUDITORY DISTURBANCES: NOT PRESENT
TREMOR: NOT VISIBLE, BUT CAN BE FELT FINGERTIP TO FINGERTIP
ORIENTATION AND CLOUDING OF SENSORIUM: ORIENTED AND CAN DO SERIAL ADDITIONS
PAROXYSMAL SWEATS: BARELY PERCEPTIBLE SWEATING, PALMS MOIST
AGITATION: NORMAL ACTIVITY
PAROXYSMAL SWEATS: BARELY PERCEPTIBLE SWEATING, PALMS MOIST
ANXIETY: MODERATELY ANXIOUS, OR GUARDED, SO ANXIETY IS INFERRED
AUDITORY DISTURBANCES: NOT PRESENT
ANXIETY: NO ANXIETY, AT EASE
AGITATION: NORMAL ACTIVITY
ANXIETY: NO ANXIETY, AT EASE
NAUSEA AND VOMITING: NO NAUSEA AND NO VOMITING
ORIENTATION AND CLOUDING OF SENSORIUM: ORIENTED AND CAN DO SERIAL ADDITIONS
PULSE: 92
AGITATION: NORMAL ACTIVITY
TOTAL SCORE: 10
PAROXYSMAL SWEATS: NO SWEAT VISIBLE
ANXIETY: MILDLY ANXIOUS
AUDITORY DISTURBANCES: NOT PRESENT
AGITATION: NORMAL ACTIVITY
HEADACHE, FULLNESS IN HEAD: NOT PRESENT
VISUAL DISTURBANCES: NOT PRESENT
AUDITORY DISTURBANCES: NOT PRESENT
TREMOR: 3
TREMOR: NOT VISIBLE, BUT CAN BE FELT FINGERTIP TO FINGERTIP
HEADACHE, FULLNESS IN HEAD: NOT PRESENT
ANXIETY: 2
AUDITORY DISTURBANCES: NOT PRESENT
TREMOR: 2
HEADACHE, FULLNESS IN HEAD: NOT PRESENT
VISUAL DISTURBANCES: NOT PRESENT
VISUAL DISTURBANCES: NOT PRESENT
TREMOR: NO TREMOR
TOTAL SCORE: 6
VISUAL DISTURBANCES: NOT PRESENT
BLOOD PRESSURE: 139/86
AUDITORY DISTURBANCES: NOT PRESENT
ANXIETY: NO ANXIETY, AT EASE
NAUSEA AND VOMITING: NO NAUSEA AND NO VOMITING
TOTAL SCORE: 0
AUDITORY DISTURBANCES: NOT PRESENT
ANXIETY: NO ANXIETY, AT EASE
ORIENTATION AND CLOUDING OF SENSORIUM: ORIENTED AND CAN DO SERIAL ADDITIONS
TREMOR: NO TREMOR
VISUAL DISTURBANCES: NOT PRESENT
HEADACHE, FULLNESS IN HEAD: MODERATE
TOTAL SCORE: 0
NAUSEA AND VOMITING: NO NAUSEA AND NO VOMITING
PAROXYSMAL SWEATS: NO SWEAT VISIBLE
VISUAL DISTURBANCES: NOT PRESENT
NAUSEA AND VOMITING: NO NAUSEA AND NO VOMITING
ANXIETY: 3
NAUSEA AND VOMITING: NO NAUSEA AND NO VOMITING
AGITATION: NORMAL ACTIVITY
AGITATION: SOMEWHAT MORE THAN NORMAL ACTIVITY
NAUSEA AND VOMITING: NO NAUSEA AND NO VOMITING
AGITATION: NORMAL ACTIVITY
ORIENTATION AND CLOUDING OF SENSORIUM: ORIENTED AND CAN DO SERIAL ADDITIONS
TOTAL SCORE: 8
TOTAL SCORE: 0
TREMOR: NO TREMOR
PAROXYSMAL SWEATS: BARELY PERCEPTIBLE SWEATING, PALMS MOIST
TOTAL SCORE: 0
PAROXYSMAL SWEATS: NO SWEAT VISIBLE
ORIENTATION AND CLOUDING OF SENSORIUM: ORIENTED AND CAN DO SERIAL ADDITIONS
HEADACHE, FULLNESS IN HEAD: VERY MILD
HEADACHE, FULLNESS IN HEAD: MODERATE
ORIENTATION AND CLOUDING OF SENSORIUM: ORIENTED AND CAN DO SERIAL ADDITIONS
VISUAL DISTURBANCES: NOT PRESENT
AGITATION: NORMAL ACTIVITY
PAROXYSMAL SWEATS: NO SWEAT VISIBLE
TOTAL SCORE: 6

## 2024-03-26 ASSESSMENT — PAIN SCALES - GENERAL: PAINLEVEL_OUTOF10: 5 - MODERATE PAIN

## 2024-03-26 ASSESSMENT — PAIN DESCRIPTION - LOCATION: LOCATION: HEAD

## 2024-03-26 NOTE — PROGRESS NOTES
"  Subjective   Gucci Winters is a 48 y.o. year old male patient who was personally seen and interviewed.  The patient was interviewed alone in his room (interviewed lying in bed), and was easily engaged and cooperative. This morning, patient reports feeling \"not to bad\" and currently rates his depression at a 1 out of 10. No suicidal ideation or plans were elicited. He also rates his anxiety at a 5 out of 10.  Patient reports he feels anxious regarding not sure of the next steps after this. No hallucinations or paranoia were endorsed or noted.          Objective   Mental Status Exam:   General: Appropriately groomed and dressed in hospital attire.   Appearance: Appears stated age.   Attitude: Calm, cooperative.   Behavior: Appropriate eye contact.   Motor Activity: No agitation or retardation. No EPS/TD.  Normal gait and station. Normal muscle tone and bulk.   Speech: Regular rate, rhythm, volume and tone, spontaneous,  fluent. Non-pressured.   Mood: \"Not to bad\"   Affect: Neutral.   Thought Process: Organized.     Thought Content: Presented to ED for intentional trazodone overdose.  Does not endorse homicidal ideation.  No overt delusions or paranoia elicited.    Thought Perception: Does not endorse auditory or visual hallucinations, does not appear to be responding to hallucinatory stimuli.   Cognition: Alert, oriented x 3. No deficits noted.  Adequate fund of knowledge. No deficit in recent and remote memory. No deficits in attention, concentration or language.   Insight: Poor, as patient does not recognize symptoms of  illness and need for recommended treatments.    Judgment: Impaired, as patient can not make reasonable decisions about ordinary activities of daily living and necessary medical care recommendations.       LABS:  Results for orders placed or performed during the hospital encounter of 03/25/24 (from the past 24 hour(s))   Triglycerides   Result Value Ref Range    Triglycerides 1,259 (H) 0 - 149 " mg/dL   Ethanol   Result Value Ref Range    Alcohol 38 (H) <=10 mg/dL   CBC and Auto Differential   Result Value Ref Range    WBC 5.2 4.4 - 11.3 x10*3/uL    nRBC 0.0 0.0 - 0.0 /100 WBCs    RBC 3.85 (L) 4.50 - 5.90 x10*6/uL    Hemoglobin 12.9 (L) 13.5 - 17.5 g/dL    Hematocrit 37.6 (L) 41.0 - 52.0 %    MCV 98 80 - 100 fL    MCH 33.5 26.0 - 34.0 pg    MCHC 34.3 32.0 - 36.0 g/dL    RDW 13.9 11.5 - 14.5 %    Platelets 169 150 - 450 x10*3/uL    Neutrophils % 53.5 40.0 - 80.0 %    Immature Granulocytes %, Automated 0.2 0.0 - 0.9 %    Lymphocytes % 37.8 13.0 - 44.0 %    Monocytes % 7.3 2.0 - 10.0 %    Eosinophils % 1.0 0.0 - 6.0 %    Basophils % 0.2 0.0 - 2.0 %    Neutrophils Absolute 2.79 1.20 - 7.70 x10*3/uL    Immature Granulocytes Absolute, Automated 0.01 0.00 - 0.70 x10*3/uL    Lymphocytes Absolute 1.97 1.20 - 4.80 x10*3/uL    Monocytes Absolute 0.38 0.10 - 1.00 x10*3/uL    Eosinophils Absolute 0.05 0.00 - 0.70 x10*3/uL    Basophils Absolute 0.01 0.00 - 0.10 x10*3/uL   Basic Metabolic Panel   Result Value Ref Range    Glucose 109 (H) 74 - 99 mg/dL    Sodium 138 136 - 145 mmol/L    Potassium 3.7 3.5 - 5.3 mmol/L    Chloride 105 98 - 107 mmol/L    Bicarbonate 27 21 - 32 mmol/L    Anion Gap 10 10 - 20 mmol/L    Urea Nitrogen 13 6 - 23 mg/dL    Creatinine 0.83 0.50 - 1.30 mg/dL    eGFR >90 >60 mL/min/1.73m*2    Calcium 8.3 (L) 8.6 - 10.3 mg/dL   Magnesium   Result Value Ref Range    Magnesium 1.50 (L) 1.60 - 2.40 mg/dL   Drug Screen, Urine   Result Value Ref Range    Amphetamine Screen, Urine Presumptive Negative Presumptive Negative    Barbiturate Screen, Urine Presumptive Negative Presumptive Negative    Benzodiazepines Screen, Urine Presumptive Negative Presumptive Negative    Cannabinoid Screen, Urine Presumptive Negative Presumptive Negative    Cocaine Metabolite Screen, Urine Presumptive Negative Presumptive Negative    Fentanyl Screen, Urine Presumptive Negative Presumptive Negative    Opiate Screen, Urine  Presumptive Negative Presumptive Negative    Oxycodone Screen, Urine Presumptive Negative Presumptive Negative    PCP Screen, Urine Presumptive Negative Presumptive Negative    Methadone Screen, Urine Presumptive Negative Presumptive Negative   Urinalysis with Reflex Microscopic   Result Value Ref Range    Color, Urine Yellow Straw, Yellow    Appearance, Urine Clear Clear    Specific Gravity, Urine 1.021 1.005 - 1.035    pH, Urine 5.0 5.0, 5.5, 6.0, 6.5, 7.0, 7.5, 8.0    Protein, Urine NEGATIVE NEGATIVE mg/dL    Glucose, Urine NEGATIVE NEGATIVE mg/dL    Blood, Urine NEGATIVE NEGATIVE    Ketones, Urine NEGATIVE NEGATIVE mg/dL    Bilirubin, Urine NEGATIVE NEGATIVE    Urobilinogen, Urine <2.0 <2.0 mg/dL    Nitrite, Urine NEGATIVE NEGATIVE    Leukocyte Esterase, Urine NEGATIVE NEGATIVE   Drug Screen, Urine With Reflex to Confirmation   Result Value Ref Range    Amphetamine Screen, Urine Presumptive Negative Presumptive Negative    Barbiturate Screen, Urine Presumptive Negative Presumptive Negative    Benzodiazepines Screen, Urine Presumptive Negative Presumptive Negative    Cannabinoid Screen, Urine Presumptive Negative Presumptive Negative    Cocaine Metabolite Screen, Urine Presumptive Negative Presumptive Negative    Fentanyl Screen, Urine Presumptive Negative Presumptive Negative    Opiate Screen, Urine Presumptive Negative Presumptive Negative    Oxycodone Screen, Urine Presumptive Negative Presumptive Negative    PCP Screen, Urine Presumptive Negative Presumptive Negative    Methadone Screen, Urine Presumptive Negative Presumptive Negative        Last Recorded Vitals  Visit Vitals  /88   Pulse 65   Temp 36.7 °C (98.1 °F)   Resp 11        Intake/Output last 3 Shifts:  No intake/output data recorded.    Relevant Results  Scheduled medications  atorvastatin, 80 mg, oral, Nightly  buPROPion, 75 mg, oral, BID  cholecalciferol, 5,000 Units, oral, Daily  fenofibrate, 145 mg, oral, Daily  folic acid, 1 mg, oral,  Daily  icosapent ethyL, 2 g, oral, BID with meals  magnesium sulfate, 4 g, intravenous, Once  montelukast, 10 mg, oral, Nightly  multivitamin with minerals, 1 tablet, oral, Daily  pantoprazole, 40 mg, oral, Daily  [START ON 3/28/2024] thiamine, 100 mg, oral, Daily  thiamine, 100 mg, intravenous, Daily  venlafaxine XR, 150 mg, oral, Daily      Continuous medications     PRN medications  PRN medications: acetaminophen, albuterol, LORazepam **OR** LORazepam **OR** LORazepam, ondansetron           Diagnostic Impression:  1) Other specified depressive disorder   2) Generalized anxiety disorder  3) Alcohol use disorder, severe, dependence  4) Intentional trazodone overdose   5) Hyperlipidemia           Recommendations:  1) Continue 1-to-1 sitter  2) Patient meets criteria for involuntary psychiatric admission due to intentional overdose.  Patient cannot leave AMA.  If patient attempts to leave AMA, pink slip patient and notify police and protective services.    3) Once patient is medically cleared, place consult order to EPAT for placement, refer to  Aparna CRUZ.              Sandy Mcclendon, APRN, CNP, PMHNP

## 2024-03-26 NOTE — PROGRESS NOTES
Critical Care Daily Progress        Subjective   Patient is a 48 y.o. male admitted on 3/25/2024  1:40 AM after an intentional trazodone overdose    Interval History: Patient without complaints, has done well over the past 24 hours..     Scheduled Medications:   atorvastatin, 80 mg, oral, Nightly  buPROPion, 75 mg, oral, BID  cholecalciferol, 5,000 Units, oral, Daily  fenofibrate, 145 mg, oral, Daily  folic acid, 1 mg, oral, Daily  icosapent ethyL, 2 g, oral, BID with meals  magnesium sulfate, 4 g, intravenous, Once  montelukast, 10 mg, oral, Nightly  multivitamin with minerals, 1 tablet, oral, Daily  pantoprazole, 40 mg, oral, Daily  [START ON 3/28/2024] thiamine, 100 mg, oral, Daily  thiamine, 100 mg, intravenous, Daily  venlafaxine XR, 150 mg, oral, Daily         Continuous Medications:         PRN Medications:   PRN medications: acetaminophen, albuterol, LORazepam **OR** LORazepam **OR** LORazepam, ondansetron    Objective   Vitals:  Most Recent:  Vitals:    03/26/24 0700   BP: 136/88   Pulse: 65   Resp: 11   Temp: 36.7 °C (98.1 °F)   SpO2: 95%       24hr Min/Max:  Temp  Min: 36.7 °C (98.1 °F)  Max: 37.3 °C (99.1 °F)  Pulse  Min: 65  Max: 97  BP  Min: 100/58  Max: 136/88  Resp  Min: 9  Max: 40  SpO2  Min: 91 %  Max: 99 %        I/O:    Intake/Output Summary (Last 24 hours) at 3/26/2024 0837  Last data filed at 3/26/2024 0543  Gross per 24 hour   Intake 3224.95 ml   Output 450 ml   Net 2774.95 ml       Physical Exam:   Physical Exam  Constitutional:       General: He is not in acute distress.     Appearance: Normal appearance. He is not ill-appearing, toxic-appearing or diaphoretic.   HENT:      Head: Normocephalic and atraumatic.   Eyes:      General: No scleral icterus.  Cardiovascular:      Rate and Rhythm: Normal rate and regular rhythm.   Pulmonary:      Effort: No respiratory distress.   Abdominal:      Palpations: Abdomen is soft.   Musculoskeletal:      Cervical back: Neck supple. No rigidity.   Skin:      Findings: No rash.   Neurological:      General: No focal deficit present.      Mental Status: He is alert and oriented to person, place, and time.          Lab/Radiology/Diagnostic Review:  Results for orders placed or performed during the hospital encounter of 03/25/24 (from the past 24 hour(s))   Triglycerides   Result Value Ref Range    Triglycerides 1,259 (H) 0 - 149 mg/dL   Ethanol   Result Value Ref Range    Alcohol 38 (H) <=10 mg/dL   CBC and Auto Differential   Result Value Ref Range    WBC 5.2 4.4 - 11.3 x10*3/uL    nRBC 0.0 0.0 - 0.0 /100 WBCs    RBC 3.85 (L) 4.50 - 5.90 x10*6/uL    Hemoglobin 12.9 (L) 13.5 - 17.5 g/dL    Hematocrit 37.6 (L) 41.0 - 52.0 %    MCV 98 80 - 100 fL    MCH 33.5 26.0 - 34.0 pg    MCHC 34.3 32.0 - 36.0 g/dL    RDW 13.9 11.5 - 14.5 %    Platelets 169 150 - 450 x10*3/uL    Neutrophils % 53.5 40.0 - 80.0 %    Immature Granulocytes %, Automated 0.2 0.0 - 0.9 %    Lymphocytes % 37.8 13.0 - 44.0 %    Monocytes % 7.3 2.0 - 10.0 %    Eosinophils % 1.0 0.0 - 6.0 %    Basophils % 0.2 0.0 - 2.0 %    Neutrophils Absolute 2.79 1.20 - 7.70 x10*3/uL    Immature Granulocytes Absolute, Automated 0.01 0.00 - 0.70 x10*3/uL    Lymphocytes Absolute 1.97 1.20 - 4.80 x10*3/uL    Monocytes Absolute 0.38 0.10 - 1.00 x10*3/uL    Eosinophils Absolute 0.05 0.00 - 0.70 x10*3/uL    Basophils Absolute 0.01 0.00 - 0.10 x10*3/uL   Basic Metabolic Panel   Result Value Ref Range    Glucose 109 (H) 74 - 99 mg/dL    Sodium 138 136 - 145 mmol/L    Potassium 3.7 3.5 - 5.3 mmol/L    Chloride 105 98 - 107 mmol/L    Bicarbonate 27 21 - 32 mmol/L    Anion Gap 10 10 - 20 mmol/L    Urea Nitrogen 13 6 - 23 mg/dL    Creatinine 0.83 0.50 - 1.30 mg/dL    eGFR >90 >60 mL/min/1.73m*2    Calcium 8.3 (L) 8.6 - 10.3 mg/dL   Magnesium   Result Value Ref Range    Magnesium 1.50 (L) 1.60 - 2.40 mg/dL   Drug Screen, Urine   Result Value Ref Range    Amphetamine Screen, Urine Presumptive Negative Presumptive Negative    Barbiturate  Screen, Urine Presumptive Negative Presumptive Negative    Benzodiazepines Screen, Urine Presumptive Negative Presumptive Negative    Cannabinoid Screen, Urine Presumptive Negative Presumptive Negative    Cocaine Metabolite Screen, Urine Presumptive Negative Presumptive Negative    Fentanyl Screen, Urine Presumptive Negative Presumptive Negative    Opiate Screen, Urine Presumptive Negative Presumptive Negative    Oxycodone Screen, Urine Presumptive Negative Presumptive Negative    PCP Screen, Urine Presumptive Negative Presumptive Negative    Methadone Screen, Urine Presumptive Negative Presumptive Negative   Urinalysis with Reflex Microscopic   Result Value Ref Range    Color, Urine Yellow Straw, Yellow    Appearance, Urine Clear Clear    Specific Gravity, Urine 1.021 1.005 - 1.035    pH, Urine 5.0 5.0, 5.5, 6.0, 6.5, 7.0, 7.5, 8.0    Protein, Urine NEGATIVE NEGATIVE mg/dL    Glucose, Urine NEGATIVE NEGATIVE mg/dL    Blood, Urine NEGATIVE NEGATIVE    Ketones, Urine NEGATIVE NEGATIVE mg/dL    Bilirubin, Urine NEGATIVE NEGATIVE    Urobilinogen, Urine <2.0 <2.0 mg/dL    Nitrite, Urine NEGATIVE NEGATIVE    Leukocyte Esterase, Urine NEGATIVE NEGATIVE   Drug Screen, Urine With Reflex to Confirmation   Result Value Ref Range    Amphetamine Screen, Urine Presumptive Negative Presumptive Negative    Barbiturate Screen, Urine Presumptive Negative Presumptive Negative    Benzodiazepines Screen, Urine Presumptive Negative Presumptive Negative    Cannabinoid Screen, Urine Presumptive Negative Presumptive Negative    Cocaine Metabolite Screen, Urine Presumptive Negative Presumptive Negative    Fentanyl Screen, Urine Presumptive Negative Presumptive Negative    Opiate Screen, Urine Presumptive Negative Presumptive Negative    Oxycodone Screen, Urine Presumptive Negative Presumptive Negative    PCP Screen, Urine Presumptive Negative Presumptive Negative    Methadone Screen, Urine Presumptive Negative Presumptive Negative      Electrocardiogram, 12-lead    Result Date: 3/25/2024  Sinus rhythm Baseline wander in lead(s) V4,V5 See ED provider note for full interpretation and clinical correlation Confirmed by Zeina Jamison ([id number]) on 3/25/2024 11:13:03 AM    Assessment/Plan   Principal Problem:    Intentional drug overdose, initial encounter (CMS/[facility])  No issues from trazodone overdose at this time.  Patient's been medically cleared.  Critical care medicine service will sign off.  [facility]  Expect patient will be transferred to an outside facility for inpatient psychiatric care.    Hypomagnesemia.  Likely related to alcohol use.  Receiving a rider.    Elevated triglyceride levels.  Patient is on chronic medical therapy.    Chronic back pain.  Received some Tylenol.  Takes some occasional NSAIDs as well.    Dragon dictation software was used to dictate this note and thus there may be minor errors in translation/transcription including garbled speech or misspellings. Please contact for clarification if needed.     Dereje Murphy MD

## 2024-03-26 NOTE — SIGNIFICANT EVENT
Application for Emergency Admission      Ready for Transfer?  Is the patient medically cleared for transfer to inpatient psychiatry: Yes  Has the patient been accepted to an inpatient psychiatric hospital: Yes    Application for Emergency Admission  IN ACCORDANCE WITH [place]  The Chief Clinical Officer of: [place]  3/26/2024 .9:55 AM    Reason for Hospitalization  The undersigned has reason to believe that: Gucci Winters Is a mentally ill person subject to hospitalization by court order under division B Section 5122.01 of [place] Revised Code, i.e., this person:    1.Yes  Represents a substantial risk of physical harm to self as manifested by evidence of threats of, or attempts at, suicide or serious self-inflicted bodily harm    2.Yes Represents a substantial risk of physical harm to others as manifested by evidence of recent homicidal or other violent behavior, evidence of recent threats that place another in reasonable fear of violent behavior and serious physical harm, or other evidence of present dangerousness    3.Yes Represents a substantial and immediate risk of serious physical impairment or injury to self as manifested by  evidence that the person is unable to provide for and is not providing for the person's basic physical needs because of the person's mental illness and that appropriate provision for those needs cannot be made  immediately available in the community    4.Yes Would benefit from treatment in a hospital for his mental illness and is in need of such treatment as manifested by evidence of behavior that creates a grave and imminent risk to substantial rights of others or  himself.    5.Yes Would benefit from treatment as manifested by evidence of behavior that indicates all of the following:       (a) The person is unlikely to survive safely in the community without supervision, based on a clinical determination.       (b) The person has a history of lack of compliance  with treatment for mental illness and one of the following applies:      (i) At least twice within the thirty-six months prior to the filing of an affidavit seeking court-ordered treatment of the person under section 5122.111 of the Revised Code, the lack of compliance has been a significant factor in necessitating hospitalization in a hospital or receipt of services in a forensic or other mental health unit of a correctional facility, provided that the thirty-six-month period shall be extended by the length of any hospitalization or incarceration of the person that occurred within the thirty-six-month period.      (ii) Within the forty-eight months prior to the filing of an affidavit seeking court-ordered treatment of the person under section 5122.111 of the Revised Code, the lack of compliance resulted in one or more acts of serious violent behavior toward self or others or threats of, or attempts at, serious physical harm to self or others, provided that the forty-eight-month period shall be extended by the length of any hospitalization or incarceration of the person that occurred within the forty-eight-month period.      (c) The person, as a result of mental illness, is unlikely to voluntarily participate in necessary treatment.       (d) In view of the person's treatment history and current behavior, the person is in need of treatment in order to prevent a relapse or deterioration that would be likely to result in substantial risk of serious harm to the person or others.    (e) Represents a substantial risk of physical harm to self or others if allowed to remain at liberty pending examination.    Therefore, it is requested that said person be admitted to the above named facility.    STATEMENT OF BELIEF    Must be filled out by one of the following: a psychiatrist, licensed physician, licensed clinical psychologist, health or ,  or .  (Statement shall include the circumstances  under which the individual was taken into custody and the reason for the person's belief that hospitalization is necessary. The statement shall also include a reference to efforts made to secure the individual's property at his residence if he was taken into custody there. Every reasonable and appropriate effort should be made to take this person into custody in the least conspicuous manner possible.)    Gucci Winters is a 48 y.o. male with past medical history of depression/anxiety, hyperlipidemia, alcohol dependence drinking almost a bottle of wine per day presented to emergency department for ingestion of 50 trazodone due to stressful situation at home.  Patient was admitted to ICU on Levophed drip.  This was quickly transitioned off and he has been off pressors for over 24 hours at this point.  His blood pressure is maintained.  He was seen and evaluated by psychiatry who recommended inpatient psych placement and one-to-one sitter.  Patient feels bad about his actions.  He can continue with his chronic medications.  He is not having any signs and symptoms of alcohol withdrawal we are prescribing him daily multivitamin and folic acid after discharge as well.      Vasyl Lamas MD 3/26/2024     _____________________________________________________________   Place of Employment: [facility]    STATEMENT OF OBSERVATION BY PSYCHIATRIST, LICENSED PHYSICIAN, OR LICENSED CLINICAL PSYCHOLOGIST, IF APPLICABLE    Place of Observation (e.g., [facility] center, general hospital, office, emergency facility)  (If applicable, please complete)    Vasyl Lamas MD 3/26/2024    _____________________________________________________________

## 2024-03-26 NOTE — SIGNIFICANT EVENT
Patient is no longer hypotensive  Patient is medically cleared for placement by EPAT  Will place EPAT referral for placement

## 2024-03-26 NOTE — PROGRESS NOTES
Social work consult placed for discharge planning. SW reviewed pt's chart and communicated with TCC, pt being recommended for inpatient psych admission, EPAT to place. No SW needs foreseen at this time. SW signing off; available upon request.     Guero Gonsales, MSW, LSW ([id number])   Care Transitions

## 2024-03-26 NOTE — DISCHARGE SUMMARY
Discharge Diagnosis  Intentional drug overdose, initial encounter (CMS/[facility])    Issues Requiring Follow-Up  Follow-up with primary care provider and psychiatry after discharge    Discharge Meds     Your medication list        START taking these medications        Instructions Last Dose Given Next Dose Due   folic acid 1 mg tablet  Commonly known as: Folvite  Start taking on: March 27, 2024      Take 1 tablet (1 mg) by mouth once daily. Do not start before March 27, 2024.       multivitamin with minerals tablet  Start taking on: March 27, 2024      Take 1 tablet by mouth once daily. Do not start before March 27, 2024.       thiamine 100 mg tablet  Commonly known as: Vitamin B-1      Take 1 tablet (100 mg) by mouth once daily.              CONTINUE taking these medications        Instructions Last Dose Given Next Dose Due   albuterol 90 mcg/actuation inhaler           atorvastatin 80 mg tablet  Commonly known as: Lipitor      Take 1 tablet (80 mg) by mouth once daily at bedtime.       buPROPion 75 mg tablet  Commonly known as: Wellbutrin      Take 1 tablet (75 mg) by mouth 2 times a day.       cholecalciferol 125 MCG (5000 UT) capsule  Commonly known as: Vitamin D-3           fenofibrate 145 mg tablet  Commonly known as: Tricor      Take 1 tablet (145 mg) by mouth once daily.       icosapent ethyL 1 gram capsule  Commonly known as: Vascepa      Take 2 capsules (2 g) by mouth 2 times a day with meals.       losartan-hydrochlorothiazide 100-25 mg tablet  Commonly known as: Hyzaar      Take 1 tablet by mouth once daily.       nebivolol 10 mg tablet  Commonly known as: Bystolic      Take 1 tablet (10 mg) by mouth once daily.       omeprazole 20 mg DR capsule  Commonly known as: PriLOSEC           QuickVue At-Home COVID-19 Test kit  Generic drug: COVID-19 antigen test           traZODone 50 mg tablet  Commonly known as: Desyrel      Take 1 tablet (50 mg) by mouth as needed at bedtime for sleep.       venlafaxine  mg  24 hr capsule  Commonly known as: Effexor-XR      Take 1 capsule (150 mg) by mouth once daily.              STOP taking these medications      omega-3 acid ethyl esters 1 gram capsule  Commonly known as: Lovaza        predniSONE 10 mg tablet  Commonly known as: Deltasone               ASK your doctor about these medications        Instructions Last Dose Given Next Dose Due   amitriptyline 10 mg tablet  Commonly known as: Elavil                     Where to Get Your Medications        These medications were sent to [facility]/pharmacy #[id number] - [place], [place] - [address]  [address], [facility] [address]      Phone: [phone]   folic acid 1 mg tablet  multivitamin with minerals tablet  thiamine 100 mg tablet         Test Results Pending At Discharge  Pending Labs       No current pending labs.            Hospital Course   Gucci Winters is a 48 y.o. male with past medical history of depression/anxiety, hyperlipidemia, alcohol dependence drinking almost a bottle of wine per day presented to emergency department for ingestion of 50 trazodone due to stressful situation at home.  Patient was admitted to ICU on Levophed drip.  This was quickly transitioned off and he has been off pressors for over 24 hours at this point.  His blood pressure is maintained.  He was seen and evaluated by psychiatry who recommended inpatient psych placement and one-to-one sitter.  Patient feels bad about his actions.  He can continue with his chronic medications.  He is not having any signs and symptoms of alcohol withdrawal we are prescribing him daily multivitamin and folic acid after discharge as well.    33 minutes spent in discharge timing    Pertinent Physical Exam At Time of Discharge  General: Not in acute distress, alert  HEENT: PERRLA, head intact and normocephalic  Neck: Normal to inspection  Lungs: Clear to auscultation, work of breathing within normal limit  Cardiac: Regular rate and rhythm  Abdomen: Soft nontender, positive bowel  sounds  : Exam deferred  Skin: Intact  Hematology: No petechia or excessive ecchymosis  Musculoskeletal: Without significant trauma  Neurological: Alert awake oriented, no focal deficit, cranial nerves grossly intact  Psych: Denies suicidal ideation and feels better about his actions    Outpatient Follow-Up  Future Appointments   Date Time Provider Department Center   5/23/2024  3:30 PM Janie Ortiz MD [id number] [facility]   9/17/2024  3:30 PM Romeo Francois DO [id number] [facility]   10/7/2024  2:30 PM Reed Ware DO [id number] [facility]         Vasyl Lamas MD

## 2024-03-27 NOTE — ED PROVIDER NOTES
Gucci Winters is a 48 y.o. patient presenting to the ED for drug overdose.  The patient took approximately 30 tablets of trazodone.  He also drink a small bottle of wine with this.  The ingested pills were approximately 1.5 hours prior to arrival.  The patient is not clear if this was a suicide attempt or not.  He does not answer questions regarding this.  He does report that he feels generally sleepy but is not able to provide further history.    Additional History Obtained from: EMS  Limitations to History: Patient is sleepy and falls asleep during exam.  ------------------------------------------------------------------------------------------------------------------------------------------  Physical Exam:  Appearance: Sleepy, cooperative,   Skin: Warm, dry, appropriate color for ethnicity.  Eyes: Cornea clear. No scleral icterus or injection.   ENT: Mucous membranes moist.  Pulmonary: No accessory muscle use or stridor. Clear lung sounds bilaterally without rhonchi or wheezing.   Cardiac: Heart sounds regular without murmur. B/L radial pulses full and symmetric.   Abdomen: Soft, not tender.  No rebound or guarding.   Musculoskeletal: No gross deformities.   Neurological: Face symmetrical. Voice slurred but understandable.  Answers orientation questions appropriately.  Moving all extremities to command.  Sensation grossly intact x 4 extremities.  Psychiatric: Appropriate mood and affect.    Medical Decision Making:  Chronic Medical Conditions Significantly Affecting Care:  has a past medical history of Anxiety, Hyperlipidemia, Hypertension, and Sleep apnea.    Social Determinants of Health Significantly Affecting Care: Alcohol use, intentional overdose    Differential Diagnosis Considered but not limited to: Patient reports intentional overdose on trazodone and alcohol.  Other substances are also of consideration.  Electrolyte disturbance, suicide attempt are considerations.      External Records Reviewed:   I  reviewed recent and relevant outside records including:     Independent Interpretation of Studies: The following studies were ordered as part of the emergency department work up and independently interpreted by me. See ED Course for details.    CBC and chemistry are generally unremarkable except for mild hypokalemia with potassium of 3.4..  Toxicology is positive for an alcohol level of 174 and otherwise negative.    Diagnoses as of 03/27/24 1708   Intentional drug overdose, initial encounter (CMS/[facility])   Hypotension, unspecified hypotension type         Escalation of Care:  Poison Control Center recommends patient can be medically cleared if asymptomatic in 4 to 6 hours.  The patient initially has softer blood pressure but is otherwise stable vital signs.  His blood pressure did however continue to trend down.  He was given fluid bolus without improvement.  He was therefore started on Levophed.  He remains on low-dose Levophed to maintain MAP greater than 65.  He does have mild hypokalemia however given his hypotension he was started on IV replacement.      Discussion of Management with Other Providers:   I discussed the patient/results with: Dr. Macias, hospitalist    Critical Care    Performed by: Arleen Herring DO  Authorized by: Arleen Herring DO    Critical care provider statement:     Critical care time (minutes):  50    Critical care time was exclusive of:  Separately billable procedures and treating other patients    Critical care was necessary to treat or prevent imminent or life-threatening deterioration of the following conditions:  Toxidrome and circulatory failure    Critical care was time spent personally by me on the following activities:  Development of treatment plan with patient or surrogate, discussions with consultants, evaluation of patient's response to treatment, examination of patient, ordering and performing treatments and interventions, ordering and review of laboratory studies, ordering and review  of radiographic studies, pulse oximetry and re-evaluation of patient's condition    Care discussed with: admitting provider             Arleen Herring DO  03/27/24 2390    EKG performed at 0153 and interpreted by me in real-time however inadvertently left off of initial documentation shows sinus rhythm at a rate of 80.  Intervals are normal.  The axis is normal.  There are no ST elevations or depressions.  No T wave changes.  No STEMI.     Arleen Herring DO  04/18/24 3500

## 2024-04-01 DIAGNOSIS — R20.2 PARESTHESIA OF SKIN: ICD-10-CM

## 2024-04-02 RX ORDER — AMITRIPTYLINE HYDROCHLORIDE 10 MG/1
TABLET, FILM COATED ORAL
Qty: 90 TABLET | Refills: 2 | OUTPATIENT
Start: 2024-04-02

## 2024-04-09 ENCOUNTER — APPOINTMENT (OUTPATIENT)
Dept: PRIMARY CARE | Facility: CLINIC | Age: 49
End: 2024-04-09
Payer: COMMERCIAL

## 2024-04-17 ENCOUNTER — APPOINTMENT (OUTPATIENT)
Dept: PRIMARY CARE | Facility: CLINIC | Age: 49
End: 2024-04-17
Payer: COMMERCIAL

## 2024-04-18 RX ORDER — VENLAFAXINE HYDROCHLORIDE 150 MG/1
150 CAPSULE, EXTENDED RELEASE ORAL DAILY
Qty: 90 CAPSULE | Refills: 1 | OUTPATIENT
Start: 2024-04-18

## 2024-04-22 DIAGNOSIS — E78.2 MIXED HYPERLIPIDEMIA: ICD-10-CM

## 2024-04-24 ENCOUNTER — OFFICE VISIT (OUTPATIENT)
Dept: PRIMARY CARE | Facility: CLINIC | Age: 49
End: 2024-04-24
Payer: COMMERCIAL

## 2024-04-24 VITALS
TEMPERATURE: 97.1 F | OXYGEN SATURATION: 93 % | HEIGHT: 68 IN | WEIGHT: 197 LBS | SYSTOLIC BLOOD PRESSURE: 122 MMHG | HEART RATE: 78 BPM | DIASTOLIC BLOOD PRESSURE: 78 MMHG | BODY MASS INDEX: 29.86 KG/M2

## 2024-04-24 DIAGNOSIS — E78.1 HYPERTRIGLYCERIDEMIA: ICD-10-CM

## 2024-04-24 DIAGNOSIS — E78.2 MIXED HYPERLIPIDEMIA: ICD-10-CM

## 2024-04-24 DIAGNOSIS — F10.10 ALCOHOL ABUSE: Primary | ICD-10-CM

## 2024-04-24 PROBLEM — T78.40XA ALLERGIES: Status: ACTIVE | Noted: 2024-04-24

## 2024-04-24 PROBLEM — G47.00 INSOMNIA: Status: ACTIVE | Noted: 2024-04-24

## 2024-04-24 PROBLEM — M19.90 OSTEOARTHRITIS: Status: ACTIVE | Noted: 2024-04-24

## 2024-04-24 PROBLEM — R73.01 IMPAIRED FASTING GLUCOSE: Status: ACTIVE | Noted: 2024-04-24

## 2024-04-24 PROCEDURE — 3078F DIAST BP <80 MM HG: CPT | Performed by: FAMILY MEDICINE

## 2024-04-24 PROCEDURE — 3008F BODY MASS INDEX DOCD: CPT | Performed by: FAMILY MEDICINE

## 2024-04-24 PROCEDURE — 99214 OFFICE O/P EST MOD 30 MIN: CPT | Performed by: FAMILY MEDICINE

## 2024-04-24 PROCEDURE — 1036F TOBACCO NON-USER: CPT | Performed by: FAMILY MEDICINE

## 2024-04-24 PROCEDURE — 3074F SYST BP LT 130 MM HG: CPT | Performed by: FAMILY MEDICINE

## 2024-04-24 RX ORDER — ICOSAPENT ETHYL 1 G/1
2 CAPSULE ORAL
Qty: 360 CAPSULE | Refills: 1 | Status: SHIPPED | OUTPATIENT
Start: 2024-04-24 | End: 2024-10-21

## 2024-04-24 RX ORDER — BUSPIRONE HYDROCHLORIDE 5 MG/1
5 TABLET ORAL 2 TIMES DAILY
COMMUNITY
Start: 2024-04-13

## 2024-04-24 RX ORDER — ACAMPROSATE CALCIUM 333 MG/1
333 TABLET, DELAYED RELEASE ORAL 3 TIMES DAILY
COMMUNITY
Start: 2024-04-01

## 2024-04-24 RX ORDER — FENOFIBRATE 145 MG/1
145 TABLET, FILM COATED ORAL DAILY
Qty: 90 TABLET | Refills: 1 | OUTPATIENT
Start: 2024-04-24 | End: 2024-10-21

## 2024-04-24 RX ORDER — ARIPIPRAZOLE 2 MG/1
2 TABLET ORAL DAILY
COMMUNITY
Start: 2024-04-13

## 2024-04-24 NOTE — PATIENT INSTRUCTIONS
Discussed discontinuing alcohol use due to increased triglycerides.  He is continuing acamprosate.  Will refill Vascepa for triglycerides.

## 2024-04-24 NOTE — PROGRESS NOTES
Assessment     ASSESSMENT/PLAN:      Patient Instructions   Discussed discontinuing alcohol use due to increased triglycerides.  He is continuing acamprosate.  Will refill Vascepa for triglycerides.      Signed by: Romeo Francois DO       FUTURE DIRECTION:   []    Subjective   SUBJECTIVE:     HPI : Patient is a 48 y.o. male who presents today for the following:     Hospitalization   States that he attempted overdose on 3/25/24 with trazodone  and required psych admission   He has since had dissolution from his marriage that triggered his symptoms   Currently in therapist and seeing psychiatrist   Currently on Abilify 2mg daily  and stopped Wellbutrin, also started buspar     ETOH abuse   Acamprosate 333mg n TID         Review of Systems    Past Medical History:   Diagnosis Date    Anxiety     Hyperlipidemia     Hypertension     Sleep apnea         Past Surgical History:   Procedure Laterality Date    OTHER SURGICAL HISTORY  07/23/2021    Vasectomy    OTHER SURGICAL HISTORY  07/23/2021    Tooth extraction        Current Outpatient Medications   Medication Instructions    acamprosate (CAMPRAL) 333 mg, oral, 3 times daily    albuterol 90 mcg/actuation inhaler inhalation    ARIPiprazole (ABILIFY) 2 mg, oral, Daily    atorvastatin (LIPITOR) 80 mg, oral, Nightly    busPIRone (BUSPAR) 5 mg, oral, 2 times daily    cholecalciferol (VITAMIN D-3) 125 mcg, oral, Daily    fenofibrate (TRICOR) 145 mg, oral, Daily    folic acid (FOLVITE) 1 mg, oral, Daily    icosapent ethyL (VASCEPA) 2 g, oral, 2 times daily with meals    losartan-hydrochlorothiazide (Hyzaar) 100-25 mg tablet 1 tablet, oral, Daily    multivitamin with minerals tablet 1 tablet, oral, Daily    nebivolol (BYSTOLIC) 10 mg, oral, Daily    omeprazole (PRILOSEC) 20 mg, oral, Daily    QuickVue At-Home COVID-19 Test kit COVID TEST    thiamine (VITAMIN B-1) 100 mg, oral, Daily    venlafaxine XR (EFFEXOR-XR) 150 mg, oral, Daily        No Known Allergies     Social  "History     Socioeconomic History    Marital status:      Spouse name: Not on file    Number of children: Not on file    Years of education: Not on file    Highest education level: Not on file   Occupational History    Not on file   Tobacco Use    Smoking status: Former     Current packs/day: 0.50     Average packs/day: 0.5 packs/day for 14.3 years (7.2 ttl pk-yrs)     Types: Cigarettes     Start date: 2010    Smokeless tobacco: Never   Vaping Use    Vaping status: Every Day    Substances: Nicotine, Flavoring   Substance and Sexual Activity    Alcohol use: Yes     Alcohol/week: 2.0 - 3.0 standard drinks of alcohol     Types: 2 - 3 Glasses of wine per week     Comment: .5 bottle wine a day    Drug use: Not Currently    Sexual activity: Not on file   Other Topics Concern    Not on file   Social History Narrative    Not on file     Social Determinants of Health     Financial Resource Strain: Not on file   Food Insecurity: Not on file   Transportation Needs: Not on file   Physical Activity: Not on file   Stress: Not on file   Social Connections: Not on file   Intimate Partner Violence: Not on file   Housing Stability: Not on file        No family history on file.     Objective     OBJECTIVE:     Vitals:    04/24/24 1607   BP: 122/78   Pulse: 78   Temp: 36.2 °C (97.1 °F)   SpO2: 93%   Weight: 89.4 kg (197 lb)   Height: 1.727 m (5' 8\")        Physical Exam  Constitutional:       Appearance: Normal appearance.   HENT:      Head: Normocephalic.   Pulmonary:      Effort: Pulmonary effort is normal.   Musculoskeletal:      Cervical back: Normal range of motion.   Neurological:      Mental Status: He is alert.   Psychiatric:         Mood and Affect: Mood normal.             "

## 2024-05-14 ENCOUNTER — APPOINTMENT (OUTPATIENT)
Dept: PRIMARY CARE | Facility: CLINIC | Age: 49
End: 2024-05-14
Payer: COMMERCIAL

## 2024-05-20 ENCOUNTER — LAB (OUTPATIENT)
Dept: LAB | Facility: LAB | Age: 49
End: 2024-05-20
Payer: COMMERCIAL

## 2024-05-20 DIAGNOSIS — Z13.21 ENCOUNTER FOR VITAMIN DEFICIENCY SCREENING: ICD-10-CM

## 2024-05-20 DIAGNOSIS — E78.2 MIXED HYPERLIPIDEMIA: ICD-10-CM

## 2024-05-20 DIAGNOSIS — R73.01 IFG (IMPAIRED FASTING GLUCOSE): ICD-10-CM

## 2024-05-20 DIAGNOSIS — Z13.29 SCREENING FOR THYROID DISORDER: ICD-10-CM

## 2024-05-20 DIAGNOSIS — Z12.5 PROSTATE CANCER SCREENING: ICD-10-CM

## 2024-05-20 DIAGNOSIS — I10 HYPERTENSION, UNSPECIFIED TYPE: ICD-10-CM

## 2024-05-20 DIAGNOSIS — Z00.00 ROUTINE GENERAL MEDICAL EXAMINATION AT A HEALTH CARE FACILITY: ICD-10-CM

## 2024-05-20 LAB
25(OH)D3 SERPL-MCNC: 45 NG/ML (ref 30–100)
ALBUMIN SERPL BCP-MCNC: 4.4 G/DL (ref 3.4–5)
ALP SERPL-CCNC: 61 U/L (ref 33–120)
ALT SERPL W P-5'-P-CCNC: 44 U/L (ref 10–52)
ANION GAP SERPL CALC-SCNC: 11 MMOL/L (ref 10–20)
AST SERPL W P-5'-P-CCNC: 48 U/L (ref 9–39)
BILIRUB SERPL-MCNC: 0.6 MG/DL (ref 0–1.2)
BUN SERPL-MCNC: 16 MG/DL (ref 6–23)
CALCIUM SERPL-MCNC: 9.8 MG/DL (ref 8.6–10.3)
CHLORIDE SERPL-SCNC: 99 MMOL/L (ref 98–107)
CHOLEST SERPL-MCNC: 203 MG/DL (ref 0–199)
CHOLESTEROL/HDL RATIO: 4
CO2 SERPL-SCNC: 30 MMOL/L (ref 21–32)
CREAT SERPL-MCNC: 0.89 MG/DL (ref 0.5–1.3)
EGFRCR SERPLBLD CKD-EPI 2021: >90 ML/MIN/1.73M*2
EST. AVERAGE GLUCOSE BLD GHB EST-MCNC: 117 MG/DL
GLUCOSE SERPL-MCNC: 118 MG/DL (ref 74–99)
HBA1C MFR BLD: 5.7 %
HDLC SERPL-MCNC: 50.6 MG/DL
LDLC SERPL CALC-MCNC: ABNORMAL MG/DL
NON HDL CHOLESTEROL: 152 MG/DL (ref 0–149)
POTASSIUM SERPL-SCNC: 4.2 MMOL/L (ref 3.5–5.3)
PROT SERPL-MCNC: 6.8 G/DL (ref 6.4–8.2)
PSA SERPL-MCNC: 0.48 NG/ML
SODIUM SERPL-SCNC: 136 MMOL/L (ref 136–145)
TRIGL SERPL-MCNC: 654 MG/DL (ref 0–149)
TSH SERPL-ACNC: 1.26 MIU/L (ref 0.44–3.98)
VIT B12 SERPL-MCNC: 388 PG/ML (ref 211–911)
VLDL: ABNORMAL

## 2024-05-20 PROCEDURE — 82306 VITAMIN D 25 HYDROXY: CPT

## 2024-05-20 PROCEDURE — 82607 VITAMIN B-12: CPT

## 2024-05-20 PROCEDURE — 80053 COMPREHEN METABOLIC PANEL: CPT

## 2024-05-20 PROCEDURE — 84443 ASSAY THYROID STIM HORMONE: CPT

## 2024-05-20 PROCEDURE — 36415 COLL VENOUS BLD VENIPUNCTURE: CPT

## 2024-05-20 PROCEDURE — 80061 LIPID PANEL: CPT

## 2024-05-20 PROCEDURE — 84153 ASSAY OF PSA TOTAL: CPT

## 2024-05-20 PROCEDURE — 83036 HEMOGLOBIN GLYCOSYLATED A1C: CPT

## 2024-05-21 DIAGNOSIS — R79.89 ELEVATED LFTS: Primary | ICD-10-CM

## 2024-05-21 RX ORDER — LOSARTAN POTASSIUM AND HYDROCHLOROTHIAZIDE 25; 100 MG/1; MG/1
1 TABLET ORAL DAILY
Qty: 90 TABLET | Refills: 1 | Status: SHIPPED | OUTPATIENT
Start: 2024-05-21

## 2024-05-21 RX ORDER — FENOFIBRATE 145 MG/1
145 TABLET, FILM COATED ORAL DAILY
Qty: 90 TABLET | Refills: 1 | Status: SHIPPED | OUTPATIENT
Start: 2024-05-21 | End: 2024-11-17

## 2024-05-21 RX ORDER — NEBIVOLOL 10 MG/1
10 TABLET ORAL DAILY
Qty: 90 TABLET | Refills: 1 | Status: SHIPPED | OUTPATIENT
Start: 2024-05-21

## 2024-05-21 RX ORDER — VENLAFAXINE HYDROCHLORIDE 150 MG/1
150 CAPSULE, EXTENDED RELEASE ORAL DAILY
Qty: 90 CAPSULE | Refills: 1 | Status: SHIPPED | OUTPATIENT
Start: 2024-05-21

## 2024-05-21 RX ORDER — ATORVASTATIN CALCIUM 80 MG/1
80 TABLET, FILM COATED ORAL NIGHTLY
Qty: 90 TABLET | Refills: 1 | Status: SHIPPED | OUTPATIENT
Start: 2024-05-21 | End: 2024-11-17

## 2024-05-23 ENCOUNTER — APPOINTMENT (OUTPATIENT)
Dept: ENDOCRINOLOGY | Facility: CLINIC | Age: 49
End: 2024-05-23
Payer: COMMERCIAL

## 2024-06-08 NOTE — LETTER
Date: 2023  RE:  Gucci Winters  :  1975      To Whom It May Concern:    Our patient, Gucci, was hospitalized from 2023-2023.  Symptoms has improved and he is back to baseline. He may return back to work without restrictions.    Their return to work date is:  2023      Sincerely,      Romeo Francois DO     Bed: 08  Expected date:   Expected time:   Means of arrival: Self  Comments:

## 2024-07-24 ENCOUNTER — APPOINTMENT (OUTPATIENT)
Dept: PRIMARY CARE | Facility: CLINIC | Age: 49
End: 2024-07-24
Payer: COMMERCIAL

## 2024-08-01 ENCOUNTER — APPOINTMENT (OUTPATIENT)
Dept: ENDOCRINOLOGY | Facility: CLINIC | Age: 49
End: 2024-08-01
Payer: COMMERCIAL

## 2024-08-02 ENCOUNTER — TELEPHONE (OUTPATIENT)
Dept: PRIMARY CARE | Facility: CLINIC | Age: 49
End: 2024-08-02
Payer: COMMERCIAL

## 2024-08-02 NOTE — TELEPHONE ENCOUNTER
Pt called rx line @ 3:08pm wanting to know if he would quilify for Ozempic since he is insulin resistant. Is this something he can take?  Would he need seen to discuss this?  Next OV 9/17/24  Please advise. Thanks. JW

## 2024-09-05 DIAGNOSIS — I10 HYPERTENSION, UNSPECIFIED TYPE: ICD-10-CM

## 2024-09-10 RX ORDER — NEBIVOLOL 10 MG/1
10 TABLET ORAL DAILY
Qty: 90 TABLET | Refills: 1 | OUTPATIENT
Start: 2024-09-10

## 2024-09-12 ENCOUNTER — TELEPHONE (OUTPATIENT)
Dept: PRIMARY CARE | Facility: CLINIC | Age: 49
End: 2024-09-12
Payer: COMMERCIAL

## 2024-09-17 ENCOUNTER — APPOINTMENT (OUTPATIENT)
Dept: PRIMARY CARE | Facility: CLINIC | Age: 49
End: 2024-09-17
Payer: COMMERCIAL

## 2024-10-07 ENCOUNTER — APPOINTMENT (OUTPATIENT)
Dept: CARDIOLOGY | Facility: CLINIC | Age: 49
End: 2024-10-07
Payer: COMMERCIAL

## 2024-10-29 ENCOUNTER — APPOINTMENT (OUTPATIENT)
Dept: PRIMARY CARE | Facility: CLINIC | Age: 49
End: 2024-10-29
Payer: COMMERCIAL

## 2024-10-29 VITALS
OXYGEN SATURATION: 97 % | DIASTOLIC BLOOD PRESSURE: 80 MMHG | SYSTOLIC BLOOD PRESSURE: 138 MMHG | HEART RATE: 76 BPM | BODY MASS INDEX: 31.78 KG/M2 | TEMPERATURE: 97.5 F | WEIGHT: 209 LBS

## 2024-10-29 DIAGNOSIS — E55.9 VITAMIN D DEFICIENCY: ICD-10-CM

## 2024-10-29 DIAGNOSIS — I10 HYPERTENSION, UNSPECIFIED TYPE: ICD-10-CM

## 2024-10-29 DIAGNOSIS — R73.01 IFG (IMPAIRED FASTING GLUCOSE): ICD-10-CM

## 2024-10-29 DIAGNOSIS — G47.33 OBSTRUCTIVE SLEEP APNEA: ICD-10-CM

## 2024-10-29 DIAGNOSIS — E78.2 MIXED HYPERLIPIDEMIA: ICD-10-CM

## 2024-10-29 DIAGNOSIS — E78.1 HYPERTRIGLYCERIDEMIA: Primary | ICD-10-CM

## 2024-10-29 DIAGNOSIS — E78.1 HYPERTRIGLYCERIDEMIA: ICD-10-CM

## 2024-10-29 PROCEDURE — 1036F TOBACCO NON-USER: CPT | Performed by: PHYSICIAN ASSISTANT

## 2024-10-29 PROCEDURE — 3075F SYST BP GE 130 - 139MM HG: CPT | Performed by: PHYSICIAN ASSISTANT

## 2024-10-29 PROCEDURE — 99214 OFFICE O/P EST MOD 30 MIN: CPT | Performed by: PHYSICIAN ASSISTANT

## 2024-10-29 PROCEDURE — 3079F DIAST BP 80-89 MM HG: CPT | Performed by: PHYSICIAN ASSISTANT

## 2024-10-29 RX ORDER — NEBIVOLOL 10 MG/1
10 TABLET ORAL DAILY
Qty: 90 TABLET | Refills: 1 | Status: SHIPPED | OUTPATIENT
Start: 2024-10-29

## 2024-10-29 RX ORDER — ATORVASTATIN CALCIUM 80 MG/1
80 TABLET, FILM COATED ORAL NIGHTLY
Qty: 90 TABLET | Refills: 1 | Status: SHIPPED | OUTPATIENT
Start: 2024-10-29 | End: 2025-04-27

## 2024-10-29 RX ORDER — VENLAFAXINE HYDROCHLORIDE 150 MG/1
150 CAPSULE, EXTENDED RELEASE ORAL DAILY
Qty: 90 CAPSULE | Refills: 1 | Status: CANCELLED | OUTPATIENT
Start: 2024-10-29

## 2024-10-29 RX ORDER — ICOSAPENT ETHYL 1 G/1
2 CAPSULE ORAL
Qty: 360 CAPSULE | Refills: 1 | Status: SHIPPED | OUTPATIENT
Start: 2024-10-29 | End: 2025-04-27

## 2024-10-29 RX ORDER — FENOFIBRATE 145 MG/1
145 TABLET, FILM COATED ORAL DAILY
Qty: 90 TABLET | Refills: 1 | Status: SHIPPED | OUTPATIENT
Start: 2024-10-29 | End: 2025-04-27

## 2024-10-29 RX ORDER — LOSARTAN POTASSIUM AND HYDROCHLOROTHIAZIDE 25; 100 MG/1; MG/1
1 TABLET ORAL DAILY
Qty: 90 TABLET | Refills: 1 | Status: SHIPPED | OUTPATIENT
Start: 2024-10-29

## 2024-10-29 RX ORDER — OMEPRAZOLE/SODIUM BICARBONATE 20MG-1.1G
1 CAPSULE ORAL
COMMUNITY

## 2024-10-29 ASSESSMENT — PATIENT HEALTH QUESTIONNAIRE - PHQ9
2. FEELING DOWN, DEPRESSED OR HOPELESS: NOT AT ALL
1. LITTLE INTEREST OR PLEASURE IN DOING THINGS: NOT AT ALL
SUM OF ALL RESPONSES TO PHQ9 QUESTIONS 1 AND 2: 0

## 2024-10-29 ASSESSMENT — ENCOUNTER SYMPTOMS
PALPITATIONS: 0
SHORTNESS OF BREATH: 0
ABDOMINAL PAIN: 0

## 2024-10-31 ENCOUNTER — TELEPHONE (OUTPATIENT)
Dept: PRIMARY CARE | Facility: CLINIC | Age: 49
End: 2024-10-31
Payer: COMMERCIAL

## 2024-12-07 DIAGNOSIS — I10 HYPERTENSION, UNSPECIFIED TYPE: ICD-10-CM

## 2024-12-09 DIAGNOSIS — I10 HYPERTENSION, UNSPECIFIED TYPE: ICD-10-CM

## 2024-12-09 RX ORDER — NEBIVOLOL 10 MG/1
10 TABLET ORAL DAILY
Qty: 90 TABLET | Refills: 1 | OUTPATIENT
Start: 2024-12-09

## 2024-12-09 RX ORDER — NEBIVOLOL 10 MG/1
10 TABLET ORAL DAILY
Qty: 90 TABLET | Refills: 1 | Status: SHIPPED | OUTPATIENT
Start: 2024-12-09

## 2024-12-17 NOTE — TELEPHONE ENCOUNTER
Placed on [facility] step 2 diet  Encourage healthy weight loss and supportive care   Pt called rx line at 1125 requesting refill on Trazodone 50mg. RX was DC from chart but looks like last RX was sent on 3/15/24.  Pt is also questioning if he has an OV.  Pt is due for OV so lets get pt OV then back to clinical staff so I can send to provider that will see pt. Thanks, CG

## 2025-01-02 ENCOUNTER — CLINICAL SUPPORT (OUTPATIENT)
Dept: URGENT CARE | Age: 50
End: 2025-01-02
Payer: COMMERCIAL

## 2025-01-06 ENCOUNTER — APPOINTMENT (OUTPATIENT)
Dept: PRIMARY CARE | Facility: CLINIC | Age: 50
End: 2025-01-06
Payer: COMMERCIAL

## 2025-01-06 VITALS
DIASTOLIC BLOOD PRESSURE: 76 MMHG | OXYGEN SATURATION: 97 % | HEIGHT: 68 IN | WEIGHT: 217 LBS | BODY MASS INDEX: 32.89 KG/M2 | TEMPERATURE: 98 F | SYSTOLIC BLOOD PRESSURE: 124 MMHG | HEART RATE: 64 BPM

## 2025-01-06 DIAGNOSIS — G89.29 CHRONIC LEFT-SIDED LOW BACK PAIN WITHOUT SCIATICA: ICD-10-CM

## 2025-01-06 DIAGNOSIS — I10 BENIGN ESSENTIAL HTN: Primary | ICD-10-CM

## 2025-01-06 DIAGNOSIS — R63.5 WEIGHT GAIN: ICD-10-CM

## 2025-01-06 DIAGNOSIS — M54.50 CHRONIC LEFT-SIDED LOW BACK PAIN WITHOUT SCIATICA: ICD-10-CM

## 2025-01-06 PROCEDURE — 3074F SYST BP LT 130 MM HG: CPT | Performed by: PHYSICIAN ASSISTANT

## 2025-01-06 PROCEDURE — 3078F DIAST BP <80 MM HG: CPT | Performed by: PHYSICIAN ASSISTANT

## 2025-01-06 PROCEDURE — 99214 OFFICE O/P EST MOD 30 MIN: CPT | Performed by: PHYSICIAN ASSISTANT

## 2025-01-06 PROCEDURE — 3008F BODY MASS INDEX DOCD: CPT | Performed by: PHYSICIAN ASSISTANT

## 2025-01-06 PROCEDURE — 1036F TOBACCO NON-USER: CPT | Performed by: PHYSICIAN ASSISTANT

## 2025-01-06 ASSESSMENT — ENCOUNTER SYMPTOMS
PALPITATIONS: 0
SHORTNESS OF BREATH: 0
ABDOMINAL PAIN: 0

## 2025-01-06 NOTE — PROGRESS NOTES
"Subjective   Patient ID: Gucci Winters is a 49 y.o. male who presents for Hypertension and Back Pain (Low back pain x 3 months    [facility]).    HPI   Recheck of HTN and complains of low back pain x 3 months.   Was seeing Dr Francois as PCP and has appt in 4/2025 with Dr Xiong as PCP.       HTN: Bartelso like his BP was elevated last week and was feeling stressed so he checked his BP and it was 141/90.  Hasn't checked it otherwise. Taking losartan-HCTZ 100/25 and Bystolic 10mg daily.   No headaches, dizziness, or chest pains. Was having a lot of work stress last week.      BP Readings from Last 3 Encounters:   01/06/25 124/76   10/29/24 138/80   04/24/24 122/78     Gaining wt.  Eating poorly lately.   Wt Readings from Last 3 Encounters:   01/06/25 98.4 kg (217 lb)   10/29/24 94.8 kg (209 lb)   04/24/24 89.4 kg (197 lb)     BMI Readings from Last 3 Encounters:   01/06/25 33.49 kg/m²   10/29/24 31.78 kg/m²   04/24/24 29.95 kg/m²     Irritated his left lower back about 3 months ago.  Has known DDD. No radiculopathy down legs.      Back up to drinking 4-5 ETOH drinks per day.     Seeing psychiatrist Dr. Hannah Damon ([facility] Last there a couple months ago. is on Abilify, Effexor, and Buspirone. Doing well.  (Attempted overdose on 3/25/24 with trazodone and required psych admission.) Went through a divorce last year.      reports that he quit smoking about 15 years ago. His smoking use included cigarettes. He has never used smokeless tobacco.    Review of Systems   Respiratory:  Negative for shortness of breath.    Cardiovascular:  Negative for chest pain and palpitations.   Gastrointestinal:  Negative for abdominal pain.       Objective   /76   Pulse 64   Temp 36.7 °C (98 °F)   Ht 1.715 m (5' 7.5\")   Wt 98.4 kg (217 lb)   SpO2 97%   BMI 33.49 kg/m²   Recheck /82.     Physical Exam  Vitals and nursing note reviewed.   HENT:      Head: Normocephalic.   Eyes:      General: No scleral " icterus.  Cardiovascular:      Rate and Rhythm: Normal rate and regular rhythm.   Pulmonary:      Effort: Pulmonary effort is normal.      Breath sounds: Normal breath sounds.   Abdominal:      Palpations: Abdomen is soft. There is no mass.      Tenderness: There is no abdominal tenderness.   Musculoskeletal:      Comments: Mild tenderness and tension of left lower lumbar paravertebral musculature without vertebral tenderness.   Skin:     General: Skin is warm and dry.   Neurological:      Mental Status: He is alert.   Psychiatric:         Mood and Affect: Affect normal.       Assessment/Plan   Diagnoses and all orders for this visit:  Benign essential HTN  Chronic left-sided low back pain without sciatica  Weight gain     Discussed diet and exercise. Encouraged wt loss.   BP seems to be doing ok today. Patient will monitor BP.  Continue same meds.   Reduce alcohol intake.   Do lumbar stretches.   Use heating pad.   Use ibuprofen short term with food.   Follow up as scheduled with Dr Nora Xiong, with fasting labs the week before as scheduled in April. .

## 2025-01-15 ENCOUNTER — TELEPHONE (OUTPATIENT)
Dept: PRIMARY CARE | Facility: CLINIC | Age: 50
End: 2025-01-15

## 2025-03-06 ENCOUNTER — TELEPHONE (OUTPATIENT)
Dept: PRIMARY CARE | Facility: CLINIC | Age: 50
End: 2025-03-06

## 2025-03-06 DIAGNOSIS — E78.2 MIXED HYPERLIPIDEMIA: ICD-10-CM

## 2025-03-06 RX ORDER — ICOSAPENT ETHYL 1 G/1
2 CAPSULE ORAL
Qty: 360 CAPSULE | Refills: 1 | Status: SHIPPED | OUTPATIENT
Start: 2025-03-06 | End: 2025-09-02

## 2025-03-06 NOTE — TELEPHONE ENCOUNTER
Pt called rx line @ 8:29am requesting RF on Vascepa. Pt out. [facility] [place]    Next OV 4/29/25  Pt compliant  Ok for RF?  Please advise. Thanks. JW

## 2025-04-29 ENCOUNTER — APPOINTMENT (OUTPATIENT)
Dept: PRIMARY CARE | Facility: CLINIC | Age: 50
End: 2025-04-29
Payer: COMMERCIAL

## 2025-05-04 DIAGNOSIS — I10 HYPERTENSION, UNSPECIFIED TYPE: ICD-10-CM

## 2025-05-05 RX ORDER — LOSARTAN POTASSIUM AND HYDROCHLOROTHIAZIDE 25; 100 MG/1; MG/1
1 TABLET ORAL DAILY
Qty: 90 TABLET | Refills: 1 | Status: SHIPPED | OUTPATIENT
Start: 2025-05-05

## 2025-05-07 LAB
25(OH)D3+25(OH)D2 SERPL-MCNC: 47 NG/ML (ref 30–100)
ALBUMIN SERPL-MCNC: 4.4 G/DL (ref 3.6–5.1)
ALP SERPL-CCNC: 73 U/L (ref 35–144)
ALT SERPL-CCNC: 26 U/L (ref 9–46)
ANION GAP SERPL CALCULATED.4IONS-SCNC: 11 MMOL/L (CALC) (ref 7–17)
AST SERPL-CCNC: 29 U/L (ref 10–35)
BILIRUB SERPL-MCNC: 0.7 MG/DL (ref 0.2–1.2)
BUN SERPL-MCNC: 15 MG/DL (ref 7–25)
CALCIUM SERPL-MCNC: 9.5 MG/DL (ref 8.6–10.3)
CHLORIDE SERPL-SCNC: 100 MMOL/L (ref 98–110)
CHOLEST SERPL-MCNC: 167 MG/DL
CHOLEST/HDLC SERPL: 3.7 (CALC)
CO2 SERPL-SCNC: 27 MMOL/L (ref 20–32)
CREAT SERPL-MCNC: 0.9 MG/DL (ref 0.7–1.3)
EGFRCR SERPLBLD CKD-EPI 2021: 104 ML/MIN/1.73M2
EST. AVERAGE GLUCOSE BLD GHB EST-MCNC: 134 MG/DL
EST. AVERAGE GLUCOSE BLD GHB EST-SCNC: 7.4 MMOL/L
GLUCOSE SERPL-MCNC: 125 MG/DL (ref 65–99)
HBA1C MFR BLD: 6.3 %
HDLC SERPL-MCNC: 45 MG/DL
LDLC SERPL CALC-MCNC: ABNORMAL MG/DL
NONHDLC SERPL-MCNC: 122 MG/DL (CALC)
POTASSIUM SERPL-SCNC: 4.1 MMOL/L (ref 3.5–5.3)
PROT SERPL-MCNC: 7.4 G/DL (ref 6.1–8.1)
SODIUM SERPL-SCNC: 138 MMOL/L (ref 135–146)
TRIGL SERPL-MCNC: 582 MG/DL

## 2025-05-29 DIAGNOSIS — E78.2 MIXED HYPERLIPIDEMIA: ICD-10-CM

## 2025-05-30 RX ORDER — FENOFIBRATE 145 MG/1
145 TABLET, FILM COATED ORAL DAILY
Qty: 90 TABLET | Refills: 1 | Status: SHIPPED | OUTPATIENT
Start: 2025-05-30 | End: 2025-11-26

## 2025-06-26 DIAGNOSIS — E78.2 MIXED HYPERLIPIDEMIA: ICD-10-CM

## 2025-06-27 RX ORDER — ATORVASTATIN CALCIUM 80 MG/1
80 TABLET, FILM COATED ORAL NIGHTLY
Qty: 90 TABLET | Refills: 0 | Status: SHIPPED | OUTPATIENT
Start: 2025-06-27 | End: 2025-12-24

## 2025-08-14 ENCOUNTER — APPOINTMENT (OUTPATIENT)
Dept: PRIMARY CARE | Facility: CLINIC | Age: 50
End: 2025-08-14

## 2025-08-14 VITALS
WEIGHT: 224 LBS | BODY MASS INDEX: 34.57 KG/M2 | DIASTOLIC BLOOD PRESSURE: 84 MMHG | HEART RATE: 82 BPM | TEMPERATURE: 97.8 F | OXYGEN SATURATION: 99 % | SYSTOLIC BLOOD PRESSURE: 122 MMHG

## 2025-08-14 DIAGNOSIS — Z12.5 SCREENING FOR PROSTATE CANCER: ICD-10-CM

## 2025-08-14 DIAGNOSIS — E78.2 MIXED HYPERLIPIDEMIA: ICD-10-CM

## 2025-08-14 DIAGNOSIS — R73.03 BORDERLINE DIABETES: ICD-10-CM

## 2025-08-14 DIAGNOSIS — I10 HYPERTENSION, UNSPECIFIED TYPE: ICD-10-CM

## 2025-08-14 DIAGNOSIS — Z12.11 SCREENING FOR COLON CANCER: Primary | ICD-10-CM

## 2025-08-14 DIAGNOSIS — F33.9 MAJOR DEPRESSION, RECURRENT, CHRONIC: ICD-10-CM

## 2025-08-14 PROCEDURE — 99214 OFFICE O/P EST MOD 30 MIN: CPT | Performed by: FAMILY MEDICINE

## 2025-08-14 PROCEDURE — 3074F SYST BP LT 130 MM HG: CPT | Performed by: FAMILY MEDICINE

## 2025-08-14 PROCEDURE — 3079F DIAST BP 80-89 MM HG: CPT | Performed by: FAMILY MEDICINE

## 2025-08-14 RX ORDER — ICOSAPENT ETHYL 1 G/1
2 CAPSULE ORAL
Qty: 360 CAPSULE | Refills: 1 | Status: SHIPPED | OUTPATIENT
Start: 2025-08-14 | End: 2026-02-10

## 2025-08-14 RX ORDER — LOSARTAN POTASSIUM AND HYDROCHLOROTHIAZIDE 25; 100 MG/1; MG/1
1 TABLET ORAL DAILY
Qty: 90 TABLET | Refills: 1 | Status: SHIPPED | OUTPATIENT
Start: 2025-08-14

## 2025-08-14 RX ORDER — ATORVASTATIN CALCIUM 80 MG/1
80 TABLET, FILM COATED ORAL NIGHTLY
Qty: 90 TABLET | Refills: 1 | Status: SHIPPED | OUTPATIENT
Start: 2025-08-14 | End: 2026-02-10

## 2025-08-14 RX ORDER — FENOFIBRATE 145 MG/1
145 TABLET, FILM COATED ORAL DAILY
Qty: 90 TABLET | Refills: 1 | Status: SHIPPED | OUTPATIENT
Start: 2025-08-14 | End: 2026-02-10

## 2025-08-14 RX ORDER — NEBIVOLOL 10 MG/1
10 TABLET ORAL DAILY
Qty: 90 TABLET | Refills: 1 | Status: SHIPPED | OUTPATIENT
Start: 2025-08-14

## 2025-08-14 ASSESSMENT — ENCOUNTER SYMPTOMS
FEVER: 0
APPETITE CHANGE: 0
SHORTNESS OF BREATH: 0
CHILLS: 0
FATIGUE: 0
ACTIVITY CHANGE: 0
LIGHT-HEADEDNESS: 0
DIZZINESS: 0
ABDOMINAL PAIN: 0

## 2026-02-16 ENCOUNTER — APPOINTMENT (OUTPATIENT)
Dept: PRIMARY CARE | Facility: CLINIC | Age: 51
End: 2026-02-16